# Patient Record
Sex: MALE | Race: WHITE | Employment: FULL TIME | URBAN - METROPOLITAN AREA
[De-identification: names, ages, dates, MRNs, and addresses within clinical notes are randomized per-mention and may not be internally consistent; named-entity substitution may affect disease eponyms.]

---

## 2021-09-21 RX ORDER — ALBUTEROL SULFATE 90 UG/1
2 AEROSOL, METERED RESPIRATORY (INHALATION) EVERY 6 HOURS PRN
COMMUNITY
Start: 2021-08-02

## 2021-09-21 RX ORDER — ZINC GLUCONATE 50 MG
50 TABLET ORAL DAILY
COMMUNITY

## 2021-09-21 RX ORDER — ROSUVASTATIN CALCIUM 40 MG/1
1 TABLET, COATED ORAL DAILY
COMMUNITY
Start: 2021-05-14

## 2021-09-21 RX ORDER — LOSARTAN POTASSIUM 50 MG/1
2 TABLET ORAL DAILY
COMMUNITY
Start: 2021-06-14

## 2021-09-21 RX ORDER — TADALAFIL 20 MG/1
20 TABLET ORAL DAILY PRN
COMMUNITY
Start: 2021-05-19

## 2021-09-21 RX ORDER — ASPIRIN 81 MG/1
81 TABLET ORAL DAILY
COMMUNITY

## 2021-09-21 RX ORDER — M-VIT,TX,IRON,MINS/CALC/FOLIC 27MG-0.4MG
1 TABLET ORAL DAILY
COMMUNITY

## 2021-09-21 NOTE — PROGRESS NOTES
[address] [facility] time____9/27/21 0900________        Surgery time_____1000_______    Take the following medications with a sip of water:    Do not eat or drink anything after 12:00 midnight prior to your surgery. This includes water chewing gum, mints and ice chips. You may brush your teeth and gargle the morning of your surgery, but do not swallow the water     Please see your family doctor/pediatrician for a history and physical and/or concerning medications. Bring any test results/reports from your physicians office. If you are under the care of a heart doctor or specialist doctor, please be aware that you may be asked to them for clearance    You may be asked to stop blood thinners such as Coumadin, Plavix, Fragmin, Lovenox, etc., or any anti-inflammatories such as:  Aspirin, Ibuprofen, Advil, Naproxen prior to your surgery. We also ask that you stop any OTC medications such as fish oil, vitamin E, glucosamine, garlic, Multivitamins, COQ 10, etc.    We ask that you do not smoke 24 hours prior to surgery  We ask that you do not  drink any alcoholic beverages 24 hours prior to surgery     You must make arrangements for a responsible adult to take you home after your surgery. For your safety you will not be allowed to leave alone or drive yourself home. Your surgery will be cancelled if you do not have a ride home. Also for your safety, it is strongly suggested that someone stay with you the first 24 hours after your surgery. A parent or legal guardian must accompany a child scheduled for surgery and plan to stay at the hospital until the child is discharged. Please do not bring other children with you. For your comfort, please wear simple loose fitting clothing to the hospital.  Please do not bring valuables.     Do not wear any make-up or nail polish on your fingers or toes      For your safety, please do not wear any jewelry or body piercing's on the day of surgery. All jewelry must be removed. If you have dentures, they will be removed before going to operating room. For your convenience, we will provide you with a container. If you wear contact lenses or glasses, they will be removed, please bring a case for them. If you have a living will and a durable power of  for healthcare, please bring in a copy. As part of our patient safety program to minimize surgical site infections, we ask you to do the following:    · Please notify your surgeon if you develop any illness between         now and the  day of your surgery. · This includes a cough, cold, fever, sore throat, nausea,         or vomiting, and diarrhea, etc.  ·  Please notify your surgeon if you experience dizziness, shortness         of breath or blurred vision between now and the time of your surgery. Do not shave your operative site 96 hours prior to surgery. For face and neck surgery, men may use an electric razor 48 hours   prior to surgery. You may shower the night before surgery or the morning of   your surgery with an antibacterial soap. You will need to bring a photo ID and insurance card    [facility] has an onsite pharmacy, would you like to utilize our pharmacy     If you will be staying overnight and use a C-pap machine, please bring   your C-pap to hospital     Our goal is to provide you with excellent care, therefore, visitors will be limited to two(2) in the room at a time so that we may focus on providing this care for you. Please contact pre-admission testing if you have any further questions. [facility] phone number:  [phone]  Please note these are generalized instructions for all surgical cases, you may be provided with more specific instructions according to your surgery.

## 2021-09-23 ENCOUNTER — ANESTHESIA EVENT (OUTPATIENT)
Dept: ENDOSCOPY | Age: 66
End: 2021-09-23
Payer: MEDICARE

## 2021-09-27 ENCOUNTER — ANESTHESIA (OUTPATIENT)
Dept: ENDOSCOPY | Age: 66
End: 2021-09-27
Payer: MEDICARE

## 2021-09-27 ENCOUNTER — HOSPITAL ENCOUNTER (OUTPATIENT)
Age: 66
Setting detail: OUTPATIENT SURGERY
Discharge: HOME | End: 2021-09-27
Attending: INTERNAL MEDICINE | Admitting: INTERNAL MEDICINE
Payer: MEDICARE

## 2021-09-27 VITALS
TEMPERATURE: 97.9 F | BODY MASS INDEX: 38.06 KG/M2 | HEIGHT: 72 IN | WEIGHT: 281 LBS | OXYGEN SATURATION: 95 % | HEART RATE: 66 BPM | DIASTOLIC BLOOD PRESSURE: 76 MMHG | SYSTOLIC BLOOD PRESSURE: 139 MMHG | RESPIRATION RATE: 18 BRPM

## 2021-09-27 VITALS — OXYGEN SATURATION: 93 % | SYSTOLIC BLOOD PRESSURE: 209 MMHG | DIASTOLIC BLOOD PRESSURE: 193 MMHG

## 2021-09-27 PROCEDURE — 3609027000 HC COLONOSCOPY: Performed by: INTERNAL MEDICINE

## 2021-09-27 PROCEDURE — 2580000003 HC RX 258: Performed by: STUDENT IN AN ORGANIZED HEALTH CARE EDUCATION/TRAINING PROGRAM

## 2021-09-27 PROCEDURE — 3700000000 HC ANESTHESIA ATTENDED CARE: Performed by: INTERNAL MEDICINE

## 2021-09-27 PROCEDURE — 2500000003 HC RX 250 WO HCPCS

## 2021-09-27 PROCEDURE — 6360000002 HC RX W HCPCS

## 2021-09-27 PROCEDURE — 3700000001 HC ADD 15 MINUTES (ANESTHESIA): Performed by: INTERNAL MEDICINE

## 2021-09-27 PROCEDURE — 7100000010 HC PHASE II RECOVERY - FIRST 15 MIN: Performed by: INTERNAL MEDICINE

## 2021-09-27 PROCEDURE — 7100000011 HC PHASE II RECOVERY - ADDTL 15 MIN: Performed by: INTERNAL MEDICINE

## 2021-09-27 RX ORDER — SODIUM CHLORIDE 0.9 % (FLUSH) 0.9 %
5-40 SYRINGE (ML) INJECTION EVERY 12 HOURS SCHEDULED
Status: DISCONTINUED | OUTPATIENT
Start: 2021-09-27 | End: 2021-09-27 | Stop reason: HOSPADM

## 2021-09-27 RX ORDER — LIDOCAINE HYDROCHLORIDE 20 MG/ML
INJECTION, SOLUTION EPIDURAL; INFILTRATION; INTRACAUDAL; PERINEURAL PRN
Status: DISCONTINUED | OUTPATIENT
Start: 2021-09-27 | End: 2021-09-27 | Stop reason: SDUPTHER

## 2021-09-27 RX ORDER — PROPOFOL 10 MG/ML
INJECTION, EMULSION INTRAVENOUS PRN
Status: DISCONTINUED | OUTPATIENT
Start: 2021-09-27 | End: 2021-09-27 | Stop reason: SDUPTHER

## 2021-09-27 RX ORDER — SODIUM CHLORIDE 9 MG/ML
25 INJECTION, SOLUTION INTRAVENOUS PRN
Status: DISCONTINUED | OUTPATIENT
Start: 2021-09-27 | End: 2021-09-27 | Stop reason: HOSPADM

## 2021-09-27 RX ORDER — SODIUM CHLORIDE 0.9 % (FLUSH) 0.9 %
5-40 SYRINGE (ML) INJECTION PRN
Status: DISCONTINUED | OUTPATIENT
Start: 2021-09-27 | End: 2021-09-27 | Stop reason: HOSPADM

## 2021-09-27 RX ORDER — SODIUM CHLORIDE 9 MG/ML
INJECTION, SOLUTION INTRAVENOUS CONTINUOUS
Status: DISCONTINUED | OUTPATIENT
Start: 2021-09-27 | End: 2021-09-27 | Stop reason: HOSPADM

## 2021-09-27 RX ADMIN — SODIUM CHLORIDE: 9 INJECTION, SOLUTION INTRAVENOUS at 09:45

## 2021-09-27 RX ADMIN — PROPOFOL 30 MG: 10 INJECTION, EMULSION INTRAVENOUS at 09:56

## 2021-09-27 RX ADMIN — PROPOFOL 20 MG: 10 INJECTION, EMULSION INTRAVENOUS at 10:02

## 2021-09-27 RX ADMIN — PROPOFOL 20 MG: 10 INJECTION, EMULSION INTRAVENOUS at 10:00

## 2021-09-27 RX ADMIN — PROPOFOL 100 MG: 10 INJECTION, EMULSION INTRAVENOUS at 09:53

## 2021-09-27 RX ADMIN — PROPOFOL 20 MG: 10 INJECTION, EMULSION INTRAVENOUS at 09:58

## 2021-09-27 RX ADMIN — LIDOCAINE HYDROCHLORIDE 60 MG: 20 INJECTION, SOLUTION EPIDURAL; INFILTRATION; INTRACAUDAL; PERINEURAL at 09:53

## 2021-09-27 ASSESSMENT — PAIN SCALES - GENERAL
PAINLEVEL_OUTOF10: 0
PAINLEVEL_OUTOF10: 0

## 2021-09-27 ASSESSMENT — PAIN - FUNCTIONAL ASSESSMENT: PAIN_FUNCTIONAL_ASSESSMENT: 0-10

## 2021-09-27 ASSESSMENT — LIFESTYLE VARIABLES: SMOKING_STATUS: 0

## 2021-09-27 NOTE — ANESTHESIA PRE PROCEDURE
[facility] Department of Anesthesiology  Pre-Anesthesia Evaluation/Consultation       Name:  Lindsey Nova  : 1955  Age:  77 y.o. MRN:  [id number]  Date: 2021           Surgeon: Surgeon(s):  Gloria Oscar MD    Procedure: Procedure(s):  COLORECTAL CANCER SCREENING, NOT HIGH RISK     No Known Allergies  There is no problem list on file for this patient. Past Medical History:   Diagnosis Date    External thrombosed hemorrhoids     Hypertension     Sleep apnea 2014    on c-pap     Past Surgical History:   Procedure Laterality Date    COLONOSCOPY      EYE SURGERY Bilateral 2018    JOINT REPLACEMENT       Social History     Tobacco Use    Smoking status: Never Smoker    Smokeless tobacco: Never Used   Vaping Use    Vaping Use: Never used   Substance Use Topics    Alcohol use: Yes    Drug use: Never     Medications  No current facility-administered medications on file prior to encounter.      Current Outpatient Medications on File Prior to Encounter   Medication Sig Dispense Refill    albuterol sulfate  (90 Base) MCG/ACT inhaler Inhale 2 puffs into the lungs every 6 hours as needed      aspirin 81 MG EC tablet Take 81 mg by mouth daily      losartan (COZAAR) 50 MG tablet 2 tablets daily      rosuvastatin (CRESTOR) 40 MG tablet 1 tablet daily      tadalafil (CIALIS) 20 MG tablet Take 20 mg by mouth daily as needed      Multiple Vitamins-Minerals (THERAPEUTIC MULTIVITAMIN-MINERALS) tablet Take 1 tablet by mouth daily      zinc gluconate 50 MG tablet Take 50 mg by mouth daily       Current Facility-Administered Medications   Medication Dose Route Frequency Provider Last Rate Last Admin    0.9 % sodium chloride infusion   IntraVENous Continuous Severo Laughter, MD        sodium chloride flush 0.9 % injection 5-40 mL  5-40 mL IntraVENous 2 times per day Severo Laughter, MD        sodium chloride flush 0.9 % injection 5-40 mL  5-40 mL IntraVENous PRN Severo Laughter, MD        0.9 % sodium chloride infusion  25 mL IntraVENous PRN Severo Laughter, MD         Vital Signs (Current)   Vitals:    09/21/21 0856   Weight: 284 lb (128.8 kg)   Height: 6' (1.829 m)                                            Vital Signs Statistics (for past 48 hrs)     No data recorded  BP Readings from Last 3 Encounters:   No data found for BP       BMI  Body mass index is 38.52 kg/m². Estimated body mass index is 38.52 kg/m² as calculated from the following:    Height as of this encounter: 6' (1.829 m). Weight as of this encounter: 284 lb (128.8 kg). CBC No results found for: WBC, RBC, HGB, HCT, MCV, RDW, PLT  CMP  No results found for: NA, K, CL, CO2, BUN, CREATININE, GFRAA, AGRATIO, LABGLOM, GLUCOSE, PROT, CALCIUM, BILITOT, ALKPHOS, AST, ALT  BMP  No results found for: NA, K, CL, CO2, BUN, CREATININE, CALCIUM, GFRAA, LABGLOM, GLUCOSE  POCGlucose  No results for input(s): GLUCOSE in the last 72 hours.    Coags  No results found for: PROTIME, INR, APTT  HCG (If Applicable) No results found for: PREGTESTUR, PREGSERUM, HCG, HCGQUANT   ABGs No results found for: PHART, PO2ART, WCU8UKR, BLI9VDZ, BEART, K6NQQPFI   Type & Screen (If Applicable)  No results found for: LABABO, LABRH                         BMI: Wt Readings from Last 3 Encounters:       NPO Status:  >8h                          Anesthesia Evaluation  Patient summary reviewed no history of anesthetic complications:   Airway: Mallampati: II  TM distance: >3 FB   Neck ROM: full  Mouth opening: > = 3 FB Dental: normal exam         Pulmonary: breath sounds clear to auscultation  (+) sleep apnea:      (-) COPD, asthma and not a current smoker                           Cardiovascular:    (+) hypertension:,     (-) past MI, CABG/stent,  angina and no hyperlipidemia        Rate: normal                    Neuro/Psych:      (-) seizures, TIA and CVA           GI/Hepatic/Renal:   (+) bowel prep,      (-) GERD Endo/Other:        (-) diabetes mellitus, hypothyroidism               Abdominal:             Vascular:     - DVT and PE. Other Findings:             Anesthesia Plan      MAC     ASA 2       Induction: intravenous. Anesthetic plan and risks discussed with patient. Plan discussed with CRNA. This pre-anesthesia assessment may be used as a history and physical.    DOS STAFF ADDENDUM:    Pt seen and examined, chart reviewed (including anesthesia, drug and allergy history). No interval changes to history and physical examination. Anesthetic plan, risks, benefits, alternatives, and personnel involved discussed with patient. Patient verbalized an understanding and agrees to proceed.       Cecilia Hartman MD  September 27, 2021  9:29 AM

## 2021-09-27 NOTE — H&P
[facility]   Pre-operative History and Physical    Patient: Landry Mehta  :   Acct#: [de-identified]    History Obtained From: electronic medical record    HISTORY OF PRESENT ILLNESS  Procedure:Colonoscopy  Indications:screening  Past Medical History:        Diagnosis Date    External thrombosed hemorrhoids 2009    Hypertension     Sleep apnea 2014    on c-pap     Past Surgical History:        Procedure Laterality Date    COLONOSCOPY      EYE SURGERY Bilateral 2018    JOINT REPLACEMENT       Medications prior to admission:   Prior to Admission medications    Medication Sig Start Date End Date Taking? Authorizing Provider   albuterol sulfate  (90 Base) MCG/ACT inhaler Inhale 2 puffs into the lungs every 6 hours as needed 21  Yes Historical Provider, MD   aspirin 81 MG EC tablet Take 81 mg by mouth daily   Yes Historical Provider, MD   losartan (COZAAR) 50 MG tablet 2 tablets daily 21  Yes Historical Provider, MD   rosuvastatin (CRESTOR) 40 MG tablet 1 tablet daily 21  Yes Historical Provider, MD   tadalafil (CIALIS) 20 MG tablet Take 20 mg by mouth daily as needed 21  Yes Historical Provider, MD   Multiple Vitamins-Minerals (THERAPEUTIC MULTIVITAMIN-MINERALS) tablet Take 1 tablet by mouth daily   Yes Historical Provider, MD   zinc gluconate 50 MG tablet Take 50 mg by mouth daily   Yes Historical Provider, MD     Allergies:   Patient has no known allergies. Social History     Socioeconomic History    Marital status:      Spouse name: Not on file    Number of children: Not on file    Years of education: Not on file    Highest education level: Not on file   Occupational History    Not on file   Tobacco Use    Smoking status: Never Smoker    Smokeless tobacco: Never Used   Vaping Use    Vaping Use: Never used   Substance and Sexual Activity    Alcohol use:  Yes    Drug use: Never    Sexual activity: Yes     Partners: Female   Other Topics Concern    Not on file   Social History Narrative    Not on file     Social Determinants of Health     Financial Resource Strain:     Difficulty of Paying Living Expenses:    Food Insecurity:     Worried About Running Out of Food in the Last Year:     [address] in the Last Year:    Transportation Needs:     Lack of Transportation (Medical):  Lack of Transportation (Non-Medical):    Physical Activity:     Days of Exercise per Week:     Minutes of Exercise per Session:    Stress:     Feeling of Stress :    Social Connections:     Frequency of Communication with Friends and Family:     Frequency of Social Gatherings with Friends and Family:     Attends Orthodoxy Services:     Active Member of Clubs or Organizations:     Attends Club or Organization Meetings:     Marital Status:    Intimate Partner Violence:     Fear of Current or Ex-Partner:     Emotionally Abused:     Physically Abused:     Sexually Abused:      Family History   Problem Relation Age of Onset    Other Mother         liver failure    Heart Disease Father     High Blood Pressure Father     Cancer Father         leukemia         PHYSICAL EXAM:      BP (!) 178/83   Pulse 70   Temp 98.2 °F (36.8 °C) (Temporal)   Resp 18   Ht 6' (1.829 m)   Wt 281 lb (127.5 kg)   SpO2 98%   BMI 38.11 kg/m²  I        Heart:normal    Lungs: normal    Abdomen: normal      ASA Grade:  See anesthesia note      ASSESSMENT AND PLAN:    1. Procedure options, risks and benefits reviewed with patient and expresses understanding.

## 2021-09-27 NOTE — PROCEDURES
[facility]  Endoscopy Note    Patient: Brian Black  :   Acct#: [de-identified]    Procedure: Colonoscopy     Date:  2021    Surgeon:  Bobbi Weaver MD, MD    Referring Physician:  Raj Reza    Previous Colonoscopy: Yes  Date: unknown  Greater than 3 years? Yes    Preoperative Diagnosis:  screening    Postoperative Diagnosis:  Normal colon but poor prep    Anesthesia:  See anesthesia note    Indications: This is a 77y.o. year old male who presents today with screening for colon cancer. Procedure: An informed consent was obtained from the patient after explanation of indications, benefits, possible risks and complications of the procedure. The patient was then taken to the endoscopy suite, placed in the left lateral decubitus position, and the above IV anesthesia was administered. A digital rectal examination was performed and revealed negative without mass, lesions or tenderness. The Olympus CFQ-180-AL video colonoscope was placed in the patient's rectum under digital direction and advanced to the cecum. The cecum was identified by characteristic anatomy and ballottment. The ileocecal valve was identified. The preparation was poor. The scope was then withdrawn back through the cecum, ascending, transverse, descending and sigmoid colons. Carefull circumferential examination of the mucosa in these areas demonstrated normal colonic mucosa throughout. The scope was then withdrawn into the rectum and retroflexed. The retroflexed view of the anal verge and rectum demonstrates no abnormalities. The scope was straightened, the colon was decompressed and the scope was withdrawn from the patient. The patient tolerated the procedure well and was taken to the PACU in good condition. Estimated Blood Loss:  none    Impression:  Normal colon    Recommendations:  Repeat colonoscopy in 5 years.     Bobbi Weaver MD, MD   [facility]  2021

## 2025-01-11 ENCOUNTER — HOSPITAL ENCOUNTER (INPATIENT)
Age: 70
LOS: 1 days | Discharge: HOME | DRG: 312 | End: 2025-01-13
Attending: STUDENT IN AN ORGANIZED HEALTH CARE EDUCATION/TRAINING PROGRAM
Payer: MEDICARE

## 2025-01-11 ENCOUNTER — APPOINTMENT (OUTPATIENT)
Dept: CT IMAGING | Age: 70
DRG: 312 | End: 2025-01-11
Payer: MEDICARE

## 2025-01-11 ENCOUNTER — APPOINTMENT (OUTPATIENT)
Dept: GENERAL RADIOLOGY | Age: 70
DRG: 312 | End: 2025-01-11
Payer: MEDICARE

## 2025-01-11 DIAGNOSIS — R55 SYNCOPE AND COLLAPSE: Primary | ICD-10-CM

## 2025-01-11 DIAGNOSIS — R40.4 TRANSIENT ALTERATION OF AWARENESS: ICD-10-CM

## 2025-01-11 DIAGNOSIS — R11.2 NAUSEA AND VOMITING, UNSPECIFIED VOMITING TYPE: ICD-10-CM

## 2025-01-11 DIAGNOSIS — R55 SYNCOPE, UNSPECIFIED SYNCOPE TYPE: ICD-10-CM

## 2025-01-11 LAB
ALBUMIN SERPL-MCNC: 4.4 G/DL (ref 3.4–5)
ALBUMIN/GLOB SERPL: 2.1 {RATIO} (ref 1.1–2.2)
ALP SERPL-CCNC: 42 U/L (ref 40–129)
ALT SERPL-CCNC: 26 U/L (ref 10–40)
ANION GAP SERPL CALCULATED.3IONS-SCNC: 12 MMOL/L (ref 3–16)
AST SERPL-CCNC: 27 U/L (ref 15–37)
BASOPHILS # BLD: 0 K/UL (ref 0–0.2)
BASOPHILS NFR BLD: 0.8 %
BILIRUB SERPL-MCNC: <0.2 MG/DL (ref 0–1)
BUN SERPL-MCNC: 22 MG/DL (ref 7–20)
CALCIUM SERPL-MCNC: 8.7 MG/DL (ref 8.3–10.6)
CHLORIDE SERPL-SCNC: 102 MMOL/L (ref 99–110)
CO2 SERPL-SCNC: 26 MMOL/L (ref 21–32)
CREAT SERPL-MCNC: 1 MG/DL (ref 0.8–1.3)
DEPRECATED RDW RBC AUTO: 14.2 % (ref 12.4–15.4)
EOSINOPHIL # BLD: 0.1 K/UL (ref 0–0.6)
EOSINOPHIL NFR BLD: 1.1 %
GFR SERPLBLD CREATININE-BSD FMLA CKD-EPI: 81 ML/MIN/{1.73_M2}
GLUCOSE BLD-MCNC: 170 MG/DL (ref 70–99)
GLUCOSE SERPL-MCNC: 149 MG/DL (ref 70–99)
HCT VFR BLD AUTO: 42 % (ref 40.5–52.5)
HGB BLD-MCNC: 14.2 G/DL (ref 13.5–17.5)
LYMPHOCYTES # BLD: 1.3 K/UL (ref 1–5.1)
LYMPHOCYTES NFR BLD: 25.5 %
MCH RBC QN AUTO: 32.1 PG (ref 26–34)
MCHC RBC AUTO-ENTMCNC: 33.8 G/DL (ref 31–36)
MCV RBC AUTO: 95.1 FL (ref 80–100)
MONOCYTES # BLD: 0.4 K/UL (ref 0–1.3)
MONOCYTES NFR BLD: 7.4 %
NEUTROPHILS # BLD: 3.4 K/UL (ref 1.7–7.7)
NEUTROPHILS NFR BLD: 65.2 %
PERFORMED ON: ABNORMAL
PLATELET # BLD AUTO: 212 K/UL (ref 135–450)
PMV BLD AUTO: 7.6 FL (ref 5–10.5)
POTASSIUM SERPL-SCNC: 3.8 MMOL/L (ref 3.5–5.1)
PROT SERPL-MCNC: 6.5 G/DL (ref 6.4–8.2)
RBC # BLD AUTO: 4.42 M/UL (ref 4.2–5.9)
SODIUM SERPL-SCNC: 140 MMOL/L (ref 136–145)
TROPONIN, HIGH SENSITIVITY: 11 NG/L (ref 0–22)
WBC # BLD AUTO: 5.2 K/UL (ref 4–11)

## 2025-01-11 PROCEDURE — 99285 EMERGENCY DEPT VISIT HI MDM: CPT

## 2025-01-11 PROCEDURE — 80053 COMPREHEN METABOLIC PANEL: CPT

## 2025-01-11 PROCEDURE — 70450 CT HEAD/BRAIN W/O DYE: CPT

## 2025-01-11 PROCEDURE — 85025 COMPLETE CBC W/AUTO DIFF WBC: CPT

## 2025-01-11 PROCEDURE — 93005 ELECTROCARDIOGRAM TRACING: CPT | Performed by: STUDENT IN AN ORGANIZED HEALTH CARE EDUCATION/TRAINING PROGRAM

## 2025-01-11 PROCEDURE — 84484 ASSAY OF TROPONIN QUANT: CPT

## 2025-01-11 PROCEDURE — 71045 X-RAY EXAM CHEST 1 VIEW: CPT

## 2025-01-11 ASSESSMENT — PAIN - FUNCTIONAL ASSESSMENT: PAIN_FUNCTIONAL_ASSESSMENT: 0-10

## 2025-01-11 ASSESSMENT — PAIN SCALES - GENERAL: PAINLEVEL_OUTOF10: 0

## 2025-01-12 ENCOUNTER — APPOINTMENT (OUTPATIENT)
Dept: CT IMAGING | Age: 70
DRG: 312 | End: 2025-01-12
Payer: MEDICARE

## 2025-01-12 PROBLEM — R55 PRE-SYNCOPE: Status: ACTIVE | Noted: 2025-01-12

## 2025-01-12 LAB
TROPONIN, HIGH SENSITIVITY: 12 NG/L (ref 0–22)
TSH SERPL DL<=0.005 MIU/L-ACNC: 1.37 UIU/ML (ref 0.27–4.2)

## 2025-01-12 PROCEDURE — 6360000004 HC RX CONTRAST MEDICATION: Performed by: STUDENT IN AN ORGANIZED HEALTH CARE EDUCATION/TRAINING PROGRAM

## 2025-01-12 PROCEDURE — 70498 CT ANGIOGRAPHY NECK: CPT

## 2025-01-12 PROCEDURE — 6370000000 HC RX 637 (ALT 250 FOR IP)

## 2025-01-12 PROCEDURE — 84443 ASSAY THYROID STIM HORMONE: CPT

## 2025-01-12 PROCEDURE — 1200000000 HC SEMI PRIVATE

## 2025-01-12 PROCEDURE — 84484 ASSAY OF TROPONIN QUANT: CPT

## 2025-01-12 PROCEDURE — 94760 N-INVAS EAR/PLS OXIMETRY 1: CPT

## 2025-01-12 PROCEDURE — 2500000003 HC RX 250 WO HCPCS

## 2025-01-12 PROCEDURE — 6360000002 HC RX W HCPCS

## 2025-01-12 RX ORDER — ENOXAPARIN SODIUM 100 MG/ML
30 INJECTION SUBCUTANEOUS 2 TIMES DAILY
Status: DISCONTINUED | OUTPATIENT
Start: 2025-01-12 | End: 2025-01-13 | Stop reason: HOSPADM

## 2025-01-12 RX ORDER — ASPIRIN 81 MG/1
81 TABLET ORAL DAILY
Status: DISCONTINUED | OUTPATIENT
Start: 2025-01-12 | End: 2025-01-13 | Stop reason: HOSPADM

## 2025-01-12 RX ORDER — MAGNESIUM SULFATE IN WATER 40 MG/ML
2000 INJECTION, SOLUTION INTRAVENOUS PRN
Status: DISCONTINUED | OUTPATIENT
Start: 2025-01-12 | End: 2025-01-13 | Stop reason: HOSPADM

## 2025-01-12 RX ORDER — ALBUTEROL SULFATE 90 UG/1
2 INHALANT RESPIRATORY (INHALATION) EVERY 6 HOURS PRN
Status: DISCONTINUED | OUTPATIENT
Start: 2025-01-12 | End: 2025-01-13 | Stop reason: HOSPADM

## 2025-01-12 RX ORDER — DULAGLUTIDE 4.5 MG/.5ML
4.5 INJECTION, SOLUTION SUBCUTANEOUS WEEKLY
COMMUNITY
Start: 2025-01-13

## 2025-01-12 RX ORDER — POTASSIUM CHLORIDE 1500 MG/1
40 TABLET, EXTENDED RELEASE ORAL PRN
Status: DISCONTINUED | OUTPATIENT
Start: 2025-01-12 | End: 2025-01-13 | Stop reason: HOSPADM

## 2025-01-12 RX ORDER — OLMESARTAN MEDOXOMIL 20 MG/1
20 TABLET ORAL DAILY
COMMUNITY

## 2025-01-12 RX ORDER — SODIUM CHLORIDE 0.9 % (FLUSH) 0.9 %
5-40 SYRINGE (ML) INJECTION PRN
Status: DISCONTINUED | OUTPATIENT
Start: 2025-01-12 | End: 2025-01-13 | Stop reason: HOSPADM

## 2025-01-12 RX ORDER — SODIUM CHLORIDE 0.9 % (FLUSH) 0.9 %
5-40 SYRINGE (ML) INJECTION EVERY 12 HOURS SCHEDULED
Status: DISCONTINUED | OUTPATIENT
Start: 2025-01-12 | End: 2025-01-13 | Stop reason: HOSPADM

## 2025-01-12 RX ORDER — ONDANSETRON 4 MG/1
4 TABLET, ORALLY DISINTEGRATING ORAL EVERY 8 HOURS PRN
Status: DISCONTINUED | OUTPATIENT
Start: 2025-01-12 | End: 2025-01-13 | Stop reason: HOSPADM

## 2025-01-12 RX ORDER — ONDANSETRON 2 MG/ML
4 INJECTION INTRAMUSCULAR; INTRAVENOUS EVERY 6 HOURS PRN
Status: DISCONTINUED | OUTPATIENT
Start: 2025-01-12 | End: 2025-01-13 | Stop reason: HOSPADM

## 2025-01-12 RX ORDER — POTASSIUM CHLORIDE 7.45 MG/ML
10 INJECTION INTRAVENOUS PRN
Status: DISCONTINUED | OUTPATIENT
Start: 2025-01-12 | End: 2025-01-13 | Stop reason: HOSPADM

## 2025-01-12 RX ORDER — LOSARTAN POTASSIUM 25 MG/1
25 TABLET ORAL DAILY
Status: DISCONTINUED | OUTPATIENT
Start: 2025-01-12 | End: 2025-01-13 | Stop reason: HOSPADM

## 2025-01-12 RX ORDER — ACETAMINOPHEN 325 MG/1
650 TABLET ORAL EVERY 6 HOURS PRN
Status: DISCONTINUED | OUTPATIENT
Start: 2025-01-12 | End: 2025-01-13 | Stop reason: HOSPADM

## 2025-01-12 RX ORDER — METFORMIN HYDROCHLORIDE 500 MG/1
1000 TABLET, EXTENDED RELEASE ORAL
COMMUNITY

## 2025-01-12 RX ORDER — SODIUM CHLORIDE 9 MG/ML
INJECTION, SOLUTION INTRAVENOUS PRN
Status: DISCONTINUED | OUTPATIENT
Start: 2025-01-12 | End: 2025-01-13 | Stop reason: HOSPADM

## 2025-01-12 RX ORDER — POLYETHYLENE GLYCOL 3350 17 G/17G
17 POWDER, FOR SOLUTION ORAL DAILY PRN
Status: DISCONTINUED | OUTPATIENT
Start: 2025-01-12 | End: 2025-01-13 | Stop reason: HOSPADM

## 2025-01-12 RX ORDER — IOPAMIDOL 755 MG/ML
75 INJECTION, SOLUTION INTRAVASCULAR
Status: COMPLETED | OUTPATIENT
Start: 2025-01-12 | End: 2025-01-12

## 2025-01-12 RX ORDER — ACETAMINOPHEN 650 MG/1
650 SUPPOSITORY RECTAL EVERY 6 HOURS PRN
Status: DISCONTINUED | OUTPATIENT
Start: 2025-01-12 | End: 2025-01-13 | Stop reason: HOSPADM

## 2025-01-12 RX ORDER — ROSUVASTATIN CALCIUM 40 MG/1
40 TABLET, COATED ORAL DAILY
Status: DISCONTINUED | OUTPATIENT
Start: 2025-01-12 | End: 2025-01-13 | Stop reason: HOSPADM

## 2025-01-12 RX ADMIN — IOPAMIDOL 75 ML: 755 INJECTION, SOLUTION INTRAVENOUS at 01:51

## 2025-01-12 RX ADMIN — ENOXAPARIN SODIUM 30 MG: 100 INJECTION SUBCUTANEOUS at 20:47

## 2025-01-12 RX ADMIN — SODIUM CHLORIDE, PRESERVATIVE FREE 10 ML: 5 INJECTION INTRAVENOUS at 20:47

## 2025-01-12 RX ADMIN — LOSARTAN POTASSIUM 25 MG: 25 TABLET, FILM COATED ORAL at 09:08

## 2025-01-12 RX ADMIN — SODIUM CHLORIDE, PRESERVATIVE FREE 10 ML: 5 INJECTION INTRAVENOUS at 09:08

## 2025-01-12 RX ADMIN — ASPIRIN 81 MG: 81 TABLET, COATED ORAL at 09:08

## 2025-01-12 RX ADMIN — ROSUVASTATIN CALCIUM 40 MG: 40 TABLET, FILM COATED ORAL at 09:07

## 2025-01-12 RX ADMIN — ENOXAPARIN SODIUM 30 MG: 100 INJECTION SUBCUTANEOUS at 09:08

## 2025-01-12 ASSESSMENT — PAIN SCALES - GENERAL
PAINLEVEL_OUTOF10: 0
PAINLEVEL_OUTOF10: 0

## 2025-01-12 NOTE — ED TRIAGE NOTES
Patient presents after syncopal episode approximately 1 hour ago where his wife states that he was unconscious at the table at a restaurant and EMS was called. Patient wife states EMS did not bring him, but she did via private vehicle.

## 2025-01-12 NOTE — DISCHARGE INSTR - COC
Continuity of Care Form    Patient Name: Andrews Sanchez   :  1955  MRN:  [id number]    Admit date:  2025  Discharge date:  ***    Code Status Order: Full Code   Advance Directives:   Advance Care Flowsheet Documentation             Admitting Physician:  Shahla Bai MD  PCP: Jeff Oneil MD    Discharging Nurse: ***  Discharging Hospital Unit/Room#: P8G-5393/3112-01  Discharging Unit Phone Number: ***    Emergency Contact:   Extended Emergency Contact Information  Primary Emergency Contact: Andreia Sanchez  Home Phone: [phone]  Mobile Phone: [phone]  Relation: Spouse    Past Surgical History:  Past Surgical History:   Procedure Laterality Date    COLONOSCOPY      COLONOSCOPY N/A 2021    COLORECTAL CANCER SCREENING, NOT HIGH RISK performed by Don Garcia MD at [facility] ENDOSCOPY    EYE SURGERY Bilateral 2018    JOINT REPLACEMENT         Immunization History:   Immunization History   Administered Date(s) Administered    COVID-19, PFIZER PURPLE top, DILUTE for use, (age 12 y+), 30mcg/0.3mL 2021, 2021       Active Problems:  Patient Active Problem List   Diagnosis Code    Pre-syncope R55       Isolation/Infection:   Isolation            No Isolation          Patient Infection Status       None to display            Nurse Assessment:  Last Vital Signs: /67   Pulse 65   Temp 98 °F (36.7 °C) (Oral)   Resp 16   Ht 1.829 m (6')   Wt 121.4 kg (267 lb 10.2 oz)   SpO2 94%   BMI 36.30 kg/m²     Last documented pain score (0-10 scale): Pain Level: 0  Last Weight:   Wt Readings from Last 1 Encounters:   25 121.4 kg (267 lb 10.2 oz)     Mental Status:  {IP PT MENTAL STATUS:88468}    IV Access:  { BRENT IV ACCESS:609106242}    Nursing Mobility/ADLs:  Walking   {CHP DME ADLs:348117993}  Transfer  {CHP DME ADLs:624961409}  Bathing  {CHP DME ADLs:044168457}  Dressing  {CHP DME ADLs:428327438}  Toileting  {CHP DME ADLs:848810174}  Feeding  {CHP DME

## 2025-01-12 NOTE — PLAN OF CARE
Problem: Discharge Planning  Goal: Discharge to home or other facility with appropriate resources  Outcome: Progressing  Flowsheets  Taken 1/12/2025 1050  Discharge to home or other facility with appropriate resources: Identify barriers to discharge with patient and caregiver  Taken 1/12/2025 0915  Discharge to home or other facility with appropriate resources: Identify barriers to discharge with patient and caregiver     Problem: Pain  Goal: Verbalizes/displays adequate comfort level or baseline comfort level  Outcome: Progressing

## 2025-01-12 NOTE — PROGRESS NOTES
Patient in bed, A&O X4. VSS. PIV to L AC flushed, dressing CDI at this time. Patient denies pain. All AM medications taken whole without complaint (see eMAR).  Patient tolerating PO intake and appetite adequate. No other needs verbalized at this time. Standard safety precautions in place and call light within reach.

## 2025-01-12 NOTE — ED PROVIDER NOTES
FOLLOW UP THE PATIENT:  No follow-up provider specified.      Electronically Signed: Amador Mccauley DO, 01/12/25, 4:57 AM    This report has been produced using speech recognition software and may contain errors related to that system including errors in grammar, punctuation, and spelling, as well as words and phrases that may be inappropriate. If there are any questions or concerns please feel free to contact the dictating provider for clarification.       Amador Mccauley DO  01/12/25 2525

## 2025-01-12 NOTE — PROGRESS NOTES
Patient admitted to room 3112 via stretcher from ER with wife at bedside. Tele monitor placed on patient. Pt denied dizziness after getting off stretcher, denied nausea. Will continue to monitor.

## 2025-01-12 NOTE — ED NOTES
ED TO INPATIENT SBAR HANDOFF    Patient Name: Jeanette Sanchez   Preferred Name: JEANETTE  : 1955  69 y.o.   Family/Caregiver Present: no   Code Status Order: No Order  PO Status: NPO:No  Telemetry Order:   C-SSRS: Risk of Suicide: No Risk  Sitter no   Restraints:     Sepsis Risk Score      Situation  Chief Complaint   Patient presents with    Loss of Consciousness     Patient presents with episode of syncope while at a restaurant this evening. EMS arrived, and he was driven by his wife here. Wife reports episode of 2 minutes where patient was non-responsive. Patient had vomiting as well.     Brief Description of Patient's Condition:   Mental Status: oriented, alert, coherent, logical, thought processes intact, and able to concentrate and follow conversation  Arrived from:Home  Imaging:   CTA HEAD NECK W CONTRAST   Final Result   No large vessel occlusion in the head or neck.         XR CHEST 1 VIEW   Final Result   Borderline cardiomegaly. No acute cardiopulmonary process.         CT HEAD WO CONTRAST   Final Result   No acute intracranial abnormality.           Abnormal labs:   Abnormal Labs Reviewed   COMPREHENSIVE METABOLIC PANEL W/ REFLEX TO MG FOR LOW K - Abnormal; Notable for the following components:       Result Value    Glucose 149 (*)     BUN 22 (*)     All other components within normal limits   POCT GLUCOSE - Abnormal; Notable for the following components:    POC Glucose 170 (*)     All other components within normal limits       Background  Allergies: No Known Allergies  History:   Past Medical History:   Diagnosis Date    External thrombosed hemorrhoids 2009    Hypertension     Sleep apnea 2014    on c-pap       Assessment  Vitals:    Level of Consciousness: Alert (0)   Vitals:    25 0400 25 0415 25 0430 25 0445   BP: 125/78 124/74     Pulse: 63 62 64 62   Resp: 18 18 16 18   Temp:       TempSrc:       SpO2: 95% 94% 95% 96%   Weight:       Height:         Deterioration Index

## 2025-01-12 NOTE — PROGRESS NOTES
Pharmacy Medication Reconciliation Note     List of medications patient is currently taking is complete.    Source of information:   1. Conversation with Trent at bedside.      Notes regarding home medications:   1. Added Trulicity( Monday injection) and Metformin ER to list  2. He confirms taking all meds a prescribed     Candi Mejía RPH   1/12/2025  10:49 AM

## 2025-01-12 NOTE — H&P
V2.0  History and Physical      Name:  Andrews Sanchez /Age/Sex: 1955  (69 y.o. male)   MRN & CSN:  [id number] & [id number] Encounter Date/Time: 2025 11:09 AM EST   Location:  N1C-7916/3112-01 PCP: Jeff Oneil MD       Hospital Day: 2    Assessment and Plan:   Andrews Sanchez is a 69 y.o. male with a pmh of sleep apnea, on CPAP, hypertension, hyperlipidemia, post COVID myocarditis, coronary artery disease detected by CT in , on risk factor modification, obesity who presents with Pre-syncope.  Patient was with his wife elevated dinner in a restaurant.  Suddenly became unresponsive when his eyes were open.  This episode lasted around 2 minutes.  Patient placed on to the ground by the people around.  And then he developed episodes of vomiting.  Patient does not report feeling sick having any chest pain or lightheadedness or palpitation before the event.  And he become back to his normal state shortly after.  Vital stable in ER, saturation normal on room air.  Troponin negative.  EKG sinus rhythm, no acute ischemic changes.  CT head and CTA head and neck no acute pathology.  Patient admitted to hospital for further workup for presyncope.  Hospital Problems             Last Modified POA    * (Principal) Pre-syncope 2025 Yes       Plan:  Presyncope, no clear etiology.  EKG, troponin, CT head, CTA head and neck unremarkable.  Patient known history of CAD per CT in  but no intervention.  No recent echocardiogram found in the system so I will order. Patient sees Dr. Payton from cardiology at [facility].  Less likely a cardiac etiology at this point.  Consulted neurology for further input.  Plan for MRI.  Ordered.  History of hypertension, hyperlipidemia, resume home medications  History of sleep apnea    Disposition:   Current Living situation: From home  Expected Disposition: To home  Estimated D/C: Likely next 48 hours    Diet ADULT DIET; Regular   DVT Prophylaxis [x] Lovenox, []

## 2025-01-13 ENCOUNTER — APPOINTMENT (OUTPATIENT)
Age: 70
DRG: 312 | End: 2025-01-13
Attending: INTERNAL MEDICINE
Payer: MEDICARE

## 2025-01-13 ENCOUNTER — APPOINTMENT (OUTPATIENT)
Dept: MRI IMAGING | Age: 70
DRG: 312 | End: 2025-01-13
Payer: MEDICARE

## 2025-01-13 VITALS
OXYGEN SATURATION: 96 % | SYSTOLIC BLOOD PRESSURE: 143 MMHG | DIASTOLIC BLOOD PRESSURE: 88 MMHG | HEIGHT: 72 IN | RESPIRATION RATE: 16 BRPM | WEIGHT: 267 LBS | HEART RATE: 61 BPM | TEMPERATURE: 98.3 F | BODY MASS INDEX: 36.16 KG/M2

## 2025-01-13 PROBLEM — I25.10 CORONARY ARTERY CALCIFICATION: Status: ACTIVE | Noted: 2025-01-13

## 2025-01-13 PROBLEM — I77.810 ASCENDING AORTA DILATION (HCC): Status: ACTIVE | Noted: 2025-01-13

## 2025-01-13 LAB
ECHO AO ASC DIAM: 4.3 CM
ECHO AO ASCENDING AORTA INDEX: 1.78 CM/M2
ECHO AO ROOT DIAM: 4.7 CM
ECHO AO ROOT INDEX: 1.95 CM/M2
ECHO AV AREA PEAK VELOCITY: 3.1 CM2
ECHO AV AREA VTI: 3 CM2
ECHO AV AREA/BSA PEAK VELOCITY: 1.3 CM2/M2
ECHO AV AREA/BSA VTI: 1.2 CM2/M2
ECHO AV MEAN GRADIENT: 3 MMHG
ECHO AV MEAN VELOCITY: 0.8 M/S
ECHO AV PEAK GRADIENT: 6 MMHG
ECHO AV PEAK VELOCITY: 1.3 M/S
ECHO AV VELOCITY RATIO: 0.92
ECHO AV VTI: 26.7 CM
ECHO BSA: 2.48 M2
ECHO EST RA PRESSURE: 3 MMHG
ECHO IVC PROX: 1.8 CM
ECHO LA AREA 2C: 32.5 CM2
ECHO LA AREA 4C: 14.6 CM2
ECHO LA MAJOR AXIS: 6.5 CM
ECHO LA MINOR AXIS: 6.7 CM
ECHO LA VOL BP: 56 ML (ref 18–58)
ECHO LA VOL MOD A2C: 125 ML (ref 18–58)
ECHO LA VOL MOD A4C: 25 ML (ref 18–58)
ECHO LA VOL/BSA BIPLANE: 23 ML/M2 (ref 16–34)
ECHO LA VOLUME INDEX MOD A2C: 52 ML/M2 (ref 16–34)
ECHO LA VOLUME INDEX MOD A4C: 10 ML/M2 (ref 16–34)
ECHO LV E' LATERAL VELOCITY: 14.6 CM/S
ECHO LV E' SEPTAL VELOCITY: 11.1 CM/S
ECHO LV EDV A2C: 116 ML
ECHO LV EDV A4C: 116 ML
ECHO LV EDV INDEX A4C: 48 ML/M2
ECHO LV EDV NDEX A2C: 48 ML/M2
ECHO LV EF PHYSICIAN: 65 %
ECHO LV EJECTION FRACTION A2C: 68 %
ECHO LV EJECTION FRACTION A4C: 66 %
ECHO LV EJECTION FRACTION BIPLANE: 68 % (ref 55–100)
ECHO LV ESV A2C: 37 ML
ECHO LV ESV A4C: 39 ML
ECHO LV ESV INDEX A2C: 15 ML/M2
ECHO LV ESV INDEX A4C: 16 ML/M2
ECHO LV FRACTIONAL SHORTENING: 33 % (ref 28–44)
ECHO LV INTERNAL DIMENSION DIASTOLE INDEX: 2.12 CM/M2
ECHO LV INTERNAL DIMENSION DIASTOLIC: 5.1 CM (ref 4.2–5.9)
ECHO LV INTERNAL DIMENSION SYSTOLIC INDEX: 1.41 CM/M2
ECHO LV INTERNAL DIMENSION SYSTOLIC: 3.4 CM
ECHO LV IVSD: 0.9 CM (ref 0.6–1)
ECHO LV MASS 2D: 175.6 G (ref 88–224)
ECHO LV MASS INDEX 2D: 72.9 G/M2 (ref 49–115)
ECHO LV POSTERIOR WALL DIASTOLIC: 1 CM (ref 0.6–1)
ECHO LV RELATIVE WALL THICKNESS RATIO: 0.39
ECHO LVOT AREA: 3.1 CM2
ECHO LVOT AV VTI INDEX: 0.91
ECHO LVOT DIAM: 2 CM
ECHO LVOT MEAN GRADIENT: 3 MMHG
ECHO LVOT PEAK GRADIENT: 6 MMHG
ECHO LVOT PEAK VELOCITY: 1.2 M/S
ECHO LVOT STROKE VOLUME INDEX: 31.7 ML/M2
ECHO LVOT SV: 76.3 ML
ECHO LVOT VTI: 24.3 CM
ECHO MV A VELOCITY: 1.16 M/S
ECHO MV AREA VTI: 2.2 CM2
ECHO MV E DECELERATION TIME (DT): 271 MS
ECHO MV E VELOCITY: 0.88 M/S
ECHO MV E/A RATIO: 0.76
ECHO MV E/E' LATERAL: 6.03
ECHO MV E/E' RATIO (AVERAGED): 6.98
ECHO MV E/E' SEPTAL: 7.93
ECHO MV LVOT VTI INDEX: 1.42
ECHO MV MAX VELOCITY: 1.1 M/S
ECHO MV MEAN GRADIENT: 2 MMHG
ECHO MV MEAN VELOCITY: 0.6 M/S
ECHO MV PEAK GRADIENT: 5 MMHG
ECHO MV VTI: 34.5 CM
ECHO PV MAX VELOCITY: 1.1 M/S
ECHO PV PEAK GRADIENT: 4 MMHG
ECHO RA AREA 4C: 18.4 CM2
ECHO RA END SYSTOLIC VOLUME APICAL 4 CHAMBER INDEX BSA: 21 ML/M2
ECHO RA VOLUME: 50 ML
ECHO RIGHT VENTRICULAR SYSTOLIC PRESSURE (RVSP): 6 MMHG
ECHO RV BASAL DIMENSION: 3.7 CM
ECHO RV FREE WALL PEAK S': 13.8 CM/S
ECHO RV MID DIMENSION: 2.8 CM
ECHO RV TAPSE: 2.4 CM (ref 1.7–?)
ECHO TV REGURGITANT MAX VELOCITY: 0.85 M/S
ECHO TV REGURGITANT PEAK GRADIENT: 3 MMHG
EKG ATRIAL RATE: 88 BPM
EKG DIAGNOSIS: NORMAL
EKG P AXIS: 62 DEGREES
EKG P-R INTERVAL: 222 MS
EKG Q-T INTERVAL: 354 MS
EKG QRS DURATION: 88 MS
EKG QTC CALCULATION (BAZETT): 428 MS
EKG R AXIS: 71 DEGREES
EKG T AXIS: 63 DEGREES
EKG VENTRICULAR RATE: 88 BPM
GLUCOSE BLD-MCNC: 113 MG/DL (ref 70–99)
PERFORMED ON: ABNORMAL

## 2025-01-13 PROCEDURE — 6370000000 HC RX 637 (ALT 250 FOR IP): Performed by: STUDENT IN AN ORGANIZED HEALTH CARE EDUCATION/TRAINING PROGRAM

## 2025-01-13 PROCEDURE — 6370000000 HC RX 637 (ALT 250 FOR IP)

## 2025-01-13 PROCEDURE — 94760 N-INVAS EAR/PLS OXIMETRY 1: CPT

## 2025-01-13 PROCEDURE — 93306 TTE W/DOPPLER COMPLETE: CPT

## 2025-01-13 PROCEDURE — 6360000002 HC RX W HCPCS

## 2025-01-13 PROCEDURE — 9990000010 HC NO CHARGE VISIT

## 2025-01-13 PROCEDURE — 76376 3D RENDER W/INTRP POSTPROCES: CPT | Performed by: INTERNAL MEDICINE

## 2025-01-13 PROCEDURE — 2500000003 HC RX 250 WO HCPCS

## 2025-01-13 PROCEDURE — 93010 ELECTROCARDIOGRAM REPORT: CPT | Performed by: INTERNAL MEDICINE

## 2025-01-13 PROCEDURE — 70551 MRI BRAIN STEM W/O DYE: CPT

## 2025-01-13 PROCEDURE — 99223 1ST HOSP IP/OBS HIGH 75: CPT | Performed by: INTERNAL MEDICINE

## 2025-01-13 PROCEDURE — 93306 TTE W/DOPPLER COMPLETE: CPT | Performed by: INTERNAL MEDICINE

## 2025-01-13 RX ORDER — LORAZEPAM 0.5 MG/1
0.5 TABLET ORAL
Status: COMPLETED | OUTPATIENT
Start: 2025-01-13 | End: 2025-01-13

## 2025-01-13 RX ADMIN — ROSUVASTATIN CALCIUM 40 MG: 40 TABLET, FILM COATED ORAL at 09:29

## 2025-01-13 RX ADMIN — LOSARTAN POTASSIUM 25 MG: 25 TABLET, FILM COATED ORAL at 09:29

## 2025-01-13 RX ADMIN — LORAZEPAM 0.5 MG: 0.5 TABLET ORAL at 14:03

## 2025-01-13 RX ADMIN — ENOXAPARIN SODIUM 30 MG: 100 INJECTION SUBCUTANEOUS at 09:29

## 2025-01-13 RX ADMIN — SODIUM CHLORIDE, PRESERVATIVE FREE 10 ML: 5 INJECTION INTRAVENOUS at 09:30

## 2025-01-13 RX ADMIN — ASPIRIN 81 MG: 81 TABLET, COATED ORAL at 09:29

## 2025-01-13 ASSESSMENT — PAIN SCALES - GENERAL: PAINLEVEL_OUTOF10: 0

## 2025-01-13 NOTE — CONSULTS
Neurology Consult Note  Reason for Consult: presyncope    Chief complaint: passed out    Anjali Cabrera MD asked me to see Andrews Sanchez in consultation for evaluation of presyncope    History of Present Illness:  Andrews Sanchez is a 69 y.o. male who presents with LOC.     I obtained my information via interview w/ the patient, supplemented by chart review.    The patient had been out to eat.  He seemed to have a bit of trouble as he was preparing to pay the bill.  He denies any preceding symptoms.  He wife says he just kind of put his head down and initially his eyes were open but subsequently closed.  He was not responding.  There was no convulsive behavior.  No tongue biting or incontinence.  He was placed on the floor then turned on his side when he vomited multiple times.  He seemed to come to right after he vomited and was not overtly confused.  He was subsequently transported to the ED to be evaluated.     BP was 132/73.  CT head negative.  CTA head/neck negative.  No neurologic deficits.    Today he feels back to normal.  No specific neurologic complaints.      He has never experienced anything like this in the past.      Medical History:  Past Medical History:   Diagnosis Date    External thrombosed hemorrhoids 2009    Hypertension     Sleep apnea 2014    on c-pap     Past Surgical History:   Procedure Laterality Date    COLONOSCOPY      COLONOSCOPY N/A 9/27/2021    COLORECTAL CANCER SCREENING, NOT HIGH RISK performed by Don Garcia MD at [facility] ENDOSCOPY    EYE SURGERY Bilateral 2018    JOINT REPLACEMENT       Scheduled Meds:   aspirin  81 mg Oral Daily    losartan  25 mg Oral Daily    rosuvastatin  40 mg Oral Daily    sodium chloride flush  5-40 mL IntraVENous 2 times per day    enoxaparin  30 mg SubCUTAneous BID     Medications Prior to Admission:   olmesartan (BENICAR) 20 MG tablet, Take 1 tablet by mouth daily  albuterol sulfate  (90 Base) MCG/ACT inhaler, Inhale 2 puffs into the lungs 
Her/She
or lesions.   Pysch: Normal mood and affect. Alert and oriented x 4.   Neurologic: Normal gross motor and sensory exam.       Labs     CBC:   Lab Results   Component Value Date/Time    WBC 5.2 01/11/2025 09:28 PM    RBC 4.42 01/11/2025 09:28 PM    HGB 14.2 01/11/2025 09:28 PM    HCT 42.0 01/11/2025 09:28 PM    MCV 95.1 01/11/2025 09:28 PM    RDW 14.2 01/11/2025 09:28 PM     01/11/2025 09:28 PM     CMP:  Lab Results   Component Value Date/Time     01/11/2025 09:28 PM    K 3.8 01/11/2025 09:28 PM     01/11/2025 09:28 PM    CO2 26 01/11/2025 09:28 PM    BUN 22 01/11/2025 09:28 PM    CREATININE 1.0 01/11/2025 09:28 PM    AGRATIO 2.1 01/11/2025 09:28 PM    LABGLOM 81 01/11/2025 09:28 PM    GLUCOSE 149 01/11/2025 09:28 PM    CALCIUM 8.7 01/11/2025 09:28 PM    BILITOT <0.2 01/11/2025 09:28 PM    ALKPHOS 42 01/11/2025 09:28 PM    AST 27 01/11/2025 09:28 PM    ALT 26 01/11/2025 09:28 PM     PT/INR:  No results found for: \"PTINR\"  HgBA1c:  Lab Results   Component Value Date    LABA1C 5.7 (H) 02/06/2023     No results found for: \"CKTOTAL\", \"CKMB\", \"CKMBINDEX\", \"TROPONINI\"    Cardiac Data     EKG: Personally interpreted.  1/11/2025.  Normal sinus rhythm with first-degree AV block.  Otherwise normal ECG    Coronary calcium score: 8/10/2018 ([facility])  Total calcium score 133    Telemetry: Personally interpreted.  Sinus.    Assessment and Plan   1) Syncope and collapse.  Uncertain etiology.  No rhythm issues on telemetry.  ECG without  advanced heart block.  Echocardiogram completed and has not been reviewed but significant valvular disease seems unlikely.  Discussed observation versus outpatient event monitor.  Patient is comfortable with continued observation.  Patient denies typical prodrome of a vasovagal event but it did occur after a large meal and vomited after the event.    2) Coronary artery calcification.  Continue medical management risk factor modification including the use of high intensity statin

## 2025-01-13 NOTE — PROGRESS NOTES
Physical Therapy      Andrews Sanchez  1/13/2025    -chart reviewed   -patient has been up ad huyen in room   -nursing concurs   -will sign off     Electronically signed by GELY MAHMOOD PT on 1/13/2025 at 10:46 AM

## 2025-01-13 NOTE — PROGRESS NOTES
Patient to echo. MRI pre meds given. RN tried to call MRI to let them know patient will go to echo then to MRI.

## 2025-01-13 NOTE — PROGRESS NOTES
Patient up to chair, A&O Xx4. VSS. PIV flushed, dressing CDI at this time. Patient denies pain. All other AM medications taken whole without complaint (see eMAR).  Patient tolerating PO intake and appetite adequate. No other needs verbalized at this time. Standard safety precautions in place and call light within reach.

## 2025-01-13 NOTE — CARE COORDINATION
Reviewed chart and spoke with bedside RN,  patient is alert and oriented, independent, has insurance and a PCP.  Please advise Case Management if patient will have discharge planning needs.     Electronically signed by HALEY Altamirano, DULCEW, Case Management on 1/13/2025 at 4:39 PM  [place] [phone]

## 2025-01-13 NOTE — PROGRESS NOTES
Occupational Therapy  Andrews Sanchez  [id number]  [id number]    OT orders received and pt's chart reviewed. Therapist to pt's room, pt reporting he has been UAL in the room, completed toileting/grooming tasks IND. Denies concerns regarding functional status at this time and upon D/C to home. No acute OT needs identified. Will sign off.     Tanya Duran, OTR/L [id number]

## 2025-01-13 NOTE — PLAN OF CARE
Problem: Discharge Planning  Goal: Discharge to home or other facility with appropriate resources  1/13/2025 1044 by Kathy Muniz RN  Outcome: Progressing  Flowsheets (Taken 1/13/2025 0930)  Discharge to home or other facility with appropriate resources: Identify barriers to discharge with patient and caregiver  1/13/2025 0154 by Elizabeth Arroyo RN  Outcome: Progressing  Flowsheets (Taken 1/13/2025 0154)  Discharge to home or other facility with appropriate resources:   Identify barriers to discharge with patient and caregiver   Identify discharge learning needs (meds, wound care, etc)     Problem: Pain  Goal: Verbalizes/displays adequate comfort level or baseline comfort level  1/13/2025 1044 by Kathy Muniz RN  Outcome: Progressing  1/13/2025 0154 by Elizabeth Arroyo RN  Outcome: Progressing  Flowsheets (Taken 1/13/2025 0154)  Verbalizes/displays adequate comfort level or baseline comfort level:   Encourage patient to monitor pain and request assistance   Assess pain using appropriate pain scale     Problem: ABCDS Injury Assessment  Goal: Absence of physical injury  1/13/2025 1044 by Kathy Muniz, RN  Outcome: Progressing  1/13/2025 0154 by Elizabeth Arroyo RN  Outcome: Progressing  Flowsheets (Taken 1/13/2025 0154)  Absence of Physical Injury: Implement safety measures based on patient assessment

## 2025-01-13 NOTE — PROGRESS NOTES
Pt's IV line removed without complications. Discussed d/c instructions with patient, given opportunity to ask questions, and provided new medication education with side effects. Follow up appointment information included in d/c instructions. Pt verbalized understanding of d/c instructions. Patient was discharged to home with all belongings and ambulated independently outside.    Kathy Muniz RN

## 2025-01-13 NOTE — DISCHARGE INSTRUCTIONS
- please schedule an appointment to see your PCP  - please schedule an appointment to see your cardiologist   - please go to lab in one week for blood work  - please take medications as prescribed

## 2025-01-13 NOTE — PLAN OF CARE
Problem: Pain  Goal: Verbalizes/displays adequate comfort level or baseline comfort level  Outcome: Progressing  Flowsheets (Taken 1/13/2025 0154)  Verbalizes/displays adequate comfort level or baseline comfort level:   Encourage patient to monitor pain and request assistance   Assess pain using appropriate pain scale     Problem: ABCDS Injury Assessment  Goal: Absence of physical injury  Outcome: Progressing  Flowsheets (Taken 1/13/2025 0154)  Absence of Physical Injury: Implement safety measures based on patient assessment

## 2025-01-14 NOTE — DISCHARGE SUMMARY
the progress note for today's visit.  Denies any lightheaded or dizziness. Cleared for discharge by cardiology.        Physical Exam  Vitals:   Vitals:    01/13/25 1602   BP: (!) 143/88   Pulse: 61   Resp: 16   Temp: 98.3 °F (36.8 °C)   SpO2: 96%       General: NAD  Eyes: EOMI  ENT: neck supple  Cardiovascular: Regular rate.  Respiratory: Clear to auscultation  Gastrointestinal: Soft, non tender  Genitourinary: no suprapubic tenderness  Musculoskeletal: No edema  Skin: warm, dry  Neuro: Alert.  Psych: Mood appropriate.     The patient expressed appropriate understanding of and agreement with the discharge recommendations, medications, and plan.     Consults this admission:  IP CONSULT TO NEUROLOGY  IP CONSULT TO CARDIOLOGY      Discharge Instruction:   Handoff to PCP:     Follow up appointments: cardiology  Primary care physician: Jeff Oneil MD      Diet:  cardiac diet   Activity: activity as tolerated  Disposition: Discharged to:    [x]Home, [][facility], []SNF, []Acute Rehab, []Hospice   Condition on discharge: Stable    Discharge Medications:        Medication List        CONTINUE taking these medications      albuterol sulfate  (90 Base) MCG/ACT inhaler  Commonly known as: PROVENTIL;VENTOLIN;PROAIR     aspirin 81 MG EC tablet     metFORMIN 500 MG extended release tablet  Commonly known as: GLUCOPHAGE-XR     olmesartan 20 MG tablet  Commonly known as: BENICAR     rosuvastatin 40 MG tablet  Commonly known as: CRESTOR     Trulicity 4.5 MG/0.5ML Soaj  Generic drug: Dulaglutide              Objective Findings at Discharge:       BMP/CBC  Recent Labs     01/11/25  2128      K 3.8      CO2 26   BUN 22*   CREATININE 1.0   WBC 5.2   HCT 42.0          IMAGING:      Additional Information: Patient seen and examined day of discharge. For more information regarding patient's care please contact [facility] medical records [phone]    Discharge Time of 35 minutes    Electronically signed

## 2025-07-07 ENCOUNTER — HOSPITAL ENCOUNTER (INPATIENT)
Age: 70
LOS: 3 days | Discharge: HOME | End: 2025-07-10
Attending: HOSPITALIST | Admitting: HOSPITALIST
Payer: MEDICARE

## 2025-07-07 ENCOUNTER — APPOINTMENT (OUTPATIENT)
Dept: ULTRASOUND IMAGING | Age: 70
End: 2025-07-07
Payer: MEDICARE

## 2025-07-07 ENCOUNTER — APPOINTMENT (OUTPATIENT)
Dept: GENERAL RADIOLOGY | Age: 70
End: 2025-07-07
Payer: MEDICARE

## 2025-07-07 DIAGNOSIS — R17 TOTAL BILIRUBIN, ELEVATED: ICD-10-CM

## 2025-07-07 DIAGNOSIS — R10.11 ABDOMINAL PAIN, RIGHT UPPER QUADRANT: Primary | ICD-10-CM

## 2025-07-07 DIAGNOSIS — A41.9 SEPTICEMIA (HCC): ICD-10-CM

## 2025-07-07 DIAGNOSIS — R10.13 EPIGASTRIC ABDOMINAL PAIN: ICD-10-CM

## 2025-07-07 DIAGNOSIS — R74.01 TRANSAMINITIS: ICD-10-CM

## 2025-07-07 PROBLEM — K81.0 ACUTE CHOLECYSTITIS: Status: ACTIVE | Noted: 2025-07-07

## 2025-07-07 LAB
ALBUMIN SERPL-MCNC: 4 G/DL (ref 3.4–5)
ALP SERPL-CCNC: 126 U/L (ref 40–129)
ALT SERPL-CCNC: 462 U/L (ref 10–40)
ANION GAP SERPL CALCULATED.3IONS-SCNC: 14 MMOL/L (ref 3–16)
AST SERPL-CCNC: 182 U/L (ref 15–37)
BASOPHILS # BLD: 0 K/UL (ref 0–0.2)
BASOPHILS NFR BLD: 0.2 %
BILIRUB DIRECT SERPL-MCNC: 2.5 MG/DL (ref 0–0.3)
BILIRUB INDIRECT SERPL-MCNC: 0.9 MG/DL (ref 0–1)
BILIRUB SERPL-MCNC: 3.4 MG/DL (ref 0–1)
BUN SERPL-MCNC: 16 MG/DL (ref 7–20)
CALCIUM SERPL-MCNC: 9.2 MG/DL (ref 8.3–10.6)
CHLORIDE SERPL-SCNC: 103 MMOL/L (ref 99–110)
CO2 SERPL-SCNC: 21 MMOL/L (ref 21–32)
CREAT SERPL-MCNC: 1.1 MG/DL (ref 0.8–1.3)
DEPRECATED RDW RBC AUTO: 14.3 % (ref 12.4–15.4)
EOSINOPHIL # BLD: 0 K/UL (ref 0–0.6)
EOSINOPHIL NFR BLD: 1 %
GFR SERPLBLD CREATININE-BSD FMLA CKD-EPI: 72 ML/MIN/{1.73_M2}
GLUCOSE SERPL-MCNC: 118 MG/DL (ref 70–99)
HCT VFR BLD AUTO: 42.9 % (ref 40.5–52.5)
HGB BLD-MCNC: 14.7 G/DL (ref 13.5–17.5)
LACTATE BLDV-SCNC: 1.4 MMOL/L (ref 0.4–1.9)
LYMPHOCYTES # BLD: 0.1 K/UL (ref 1–5.1)
LYMPHOCYTES NFR BLD: 1.8 %
MCH RBC QN AUTO: 32.1 PG (ref 26–34)
MCHC RBC AUTO-ENTMCNC: 34.4 G/DL (ref 31–36)
MCV RBC AUTO: 93.3 FL (ref 80–100)
MONOCYTES # BLD: 0 K/UL (ref 0–1.3)
MONOCYTES NFR BLD: 1.3 %
NEUTROPHILS # BLD: 3.7 K/UL (ref 1.7–7.7)
NEUTROPHILS NFR BLD: 95.7 %
PLATELET # BLD AUTO: 127 K/UL (ref 135–450)
PMV BLD AUTO: 8.1 FL (ref 5–10.5)
POTASSIUM SERPL-SCNC: 3.7 MMOL/L (ref 3.5–5.1)
PROT SERPL-MCNC: 6.8 G/DL (ref 6.4–8.2)
RBC # BLD AUTO: 4.6 M/UL (ref 4.2–5.9)
SODIUM SERPL-SCNC: 138 MMOL/L (ref 136–145)
WBC # BLD AUTO: 3.8 K/UL (ref 4–11)

## 2025-07-07 PROCEDURE — 93005 ELECTROCARDIOGRAM TRACING: CPT | Performed by: PHYSICIAN ASSISTANT

## 2025-07-07 PROCEDURE — 87040 BLOOD CULTURE FOR BACTERIA: CPT

## 2025-07-07 PROCEDURE — 96375 TX/PRO/DX INJ NEW DRUG ADDON: CPT

## 2025-07-07 PROCEDURE — 76705 ECHO EXAM OF ABDOMEN: CPT

## 2025-07-07 PROCEDURE — 6360000002 HC RX W HCPCS: Performed by: PHYSICIAN ASSISTANT

## 2025-07-07 PROCEDURE — 80076 HEPATIC FUNCTION PANEL: CPT

## 2025-07-07 PROCEDURE — 99285 EMERGENCY DEPT VISIT HI MDM: CPT

## 2025-07-07 PROCEDURE — 2580000003 HC RX 258: Performed by: PHYSICIAN ASSISTANT

## 2025-07-07 PROCEDURE — 87186 SC STD MICRODIL/AGAR DIL: CPT

## 2025-07-07 PROCEDURE — 80048 BASIC METABOLIC PNL TOTAL CA: CPT

## 2025-07-07 PROCEDURE — 71045 X-RAY EXAM CHEST 1 VIEW: CPT

## 2025-07-07 PROCEDURE — 83605 ASSAY OF LACTIC ACID: CPT

## 2025-07-07 PROCEDURE — 85025 COMPLETE CBC W/AUTO DIFF WBC: CPT

## 2025-07-07 PROCEDURE — 1200000000 HC SEMI PRIVATE

## 2025-07-07 PROCEDURE — 96361 HYDRATE IV INFUSION ADD-ON: CPT

## 2025-07-07 PROCEDURE — 87150 DNA/RNA AMPLIFIED PROBE: CPT

## 2025-07-07 PROCEDURE — 96365 THER/PROPH/DIAG IV INF INIT: CPT

## 2025-07-07 RX ORDER — POTASSIUM CHLORIDE 7.45 MG/ML
10 INJECTION INTRAVENOUS PRN
Status: DISCONTINUED | OUTPATIENT
Start: 2025-07-07 | End: 2025-07-10 | Stop reason: HOSPADM

## 2025-07-07 RX ORDER — KETOROLAC TROMETHAMINE 15 MG/ML
15 INJECTION, SOLUTION INTRAMUSCULAR; INTRAVENOUS
Status: COMPLETED | OUTPATIENT
Start: 2025-07-07 | End: 2025-07-07

## 2025-07-07 RX ORDER — DICYCLOMINE HYDROCHLORIDE 10 MG/1
10 CAPSULE ORAL 3 TIMES DAILY PRN
COMMUNITY

## 2025-07-07 RX ORDER — DEXTROSE MONOHYDRATE AND SODIUM CHLORIDE 5; .45 G/100ML; G/100ML
INJECTION, SOLUTION INTRAVENOUS CONTINUOUS
Status: ACTIVE | OUTPATIENT
Start: 2025-07-08 | End: 2025-07-08

## 2025-07-07 RX ORDER — ACETAMINOPHEN 650 MG/1
650 SUPPOSITORY RECTAL EVERY 6 HOURS PRN
Status: DISCONTINUED | OUTPATIENT
Start: 2025-07-07 | End: 2025-07-10 | Stop reason: HOSPADM

## 2025-07-07 RX ORDER — MELOXICAM 7.5 MG/1
7.5 TABLET ORAL DAILY
COMMUNITY

## 2025-07-07 RX ORDER — 0.9 % SODIUM CHLORIDE 0.9 %
1000 INTRAVENOUS SOLUTION INTRAVENOUS ONCE
Status: COMPLETED | OUTPATIENT
Start: 2025-07-07 | End: 2025-07-07

## 2025-07-07 RX ORDER — SILDENAFIL 100 MG/1
100 TABLET, FILM COATED ORAL PRN
COMMUNITY

## 2025-07-07 RX ORDER — ONDANSETRON 2 MG/ML
4 INJECTION INTRAMUSCULAR; INTRAVENOUS ONCE
Status: DISCONTINUED | OUTPATIENT
Start: 2025-07-07 | End: 2025-07-10 | Stop reason: HOSPADM

## 2025-07-07 RX ORDER — POTASSIUM CHLORIDE 1500 MG/1
40 TABLET, EXTENDED RELEASE ORAL PRN
Status: DISCONTINUED | OUTPATIENT
Start: 2025-07-07 | End: 2025-07-10 | Stop reason: HOSPADM

## 2025-07-07 RX ORDER — SODIUM CHLORIDE 9 MG/ML
INJECTION, SOLUTION INTRAVENOUS PRN
Status: DISCONTINUED | OUTPATIENT
Start: 2025-07-07 | End: 2025-07-10 | Stop reason: HOSPADM

## 2025-07-07 RX ORDER — SODIUM CHLORIDE 0.9 % (FLUSH) 0.9 %
5-40 SYRINGE (ML) INJECTION EVERY 12 HOURS SCHEDULED
Status: DISCONTINUED | OUTPATIENT
Start: 2025-07-08 | End: 2025-07-10 | Stop reason: HOSPADM

## 2025-07-07 RX ORDER — SODIUM CHLORIDE 9 MG/ML
INJECTION, SOLUTION INTRAVENOUS CONTINUOUS
Status: ACTIVE | OUTPATIENT
Start: 2025-07-08 | End: 2025-07-08

## 2025-07-07 RX ORDER — OXYCODONE HYDROCHLORIDE 5 MG/1
5 TABLET ORAL EVERY 6 HOURS PRN
Status: ON HOLD | COMMUNITY
Start: 2025-07-06 | End: 2025-07-10 | Stop reason: HOSPADM

## 2025-07-07 RX ORDER — LOSARTAN POTASSIUM 50 MG/1
50 TABLET ORAL DAILY
Status: DISCONTINUED | OUTPATIENT
Start: 2025-07-08 | End: 2025-07-10 | Stop reason: HOSPADM

## 2025-07-07 RX ORDER — SODIUM CHLORIDE 0.9 % (FLUSH) 0.9 %
5-40 SYRINGE (ML) INJECTION PRN
Status: DISCONTINUED | OUTPATIENT
Start: 2025-07-07 | End: 2025-07-10 | Stop reason: HOSPADM

## 2025-07-07 RX ORDER — VANCOMYCIN 2 G/400ML
2000 INJECTION, SOLUTION INTRAVENOUS ONCE
Status: COMPLETED | OUTPATIENT
Start: 2025-07-07 | End: 2025-07-08

## 2025-07-07 RX ORDER — DICYCLOMINE HYDROCHLORIDE 10 MG/1
10 CAPSULE ORAL 3 TIMES DAILY PRN
Status: DISCONTINUED | OUTPATIENT
Start: 2025-07-07 | End: 2025-07-10 | Stop reason: HOSPADM

## 2025-07-07 RX ORDER — ONDANSETRON 4 MG/1
4 TABLET, FILM COATED ORAL EVERY 8 HOURS PRN
COMMUNITY

## 2025-07-07 RX ORDER — ASPIRIN 81 MG/1
81 TABLET ORAL DAILY
Status: DISCONTINUED | OUTPATIENT
Start: 2025-07-08 | End: 2025-07-10 | Stop reason: HOSPADM

## 2025-07-07 RX ORDER — ACETAMINOPHEN 325 MG/1
650 TABLET ORAL EVERY 6 HOURS PRN
Status: DISCONTINUED | OUTPATIENT
Start: 2025-07-07 | End: 2025-07-10 | Stop reason: HOSPADM

## 2025-07-07 RX ORDER — MORPHINE SULFATE 2 MG/ML
2 INJECTION, SOLUTION INTRAMUSCULAR; INTRAVENOUS ONCE
Refills: 0 | Status: DISCONTINUED | OUTPATIENT
Start: 2025-07-07 | End: 2025-07-07

## 2025-07-07 RX ORDER — MAGNESIUM SULFATE IN WATER 40 MG/ML
2000 INJECTION, SOLUTION INTRAVENOUS PRN
Status: DISCONTINUED | OUTPATIENT
Start: 2025-07-07 | End: 2025-07-10 | Stop reason: HOSPADM

## 2025-07-07 RX ORDER — POLYETHYLENE GLYCOL 3350 17 G/17G
17 POWDER, FOR SOLUTION ORAL DAILY PRN
Status: DISCONTINUED | OUTPATIENT
Start: 2025-07-07 | End: 2025-07-10 | Stop reason: HOSPADM

## 2025-07-07 RX ORDER — ONDANSETRON 4 MG/1
4 TABLET, ORALLY DISINTEGRATING ORAL EVERY 8 HOURS PRN
Status: DISCONTINUED | OUTPATIENT
Start: 2025-07-07 | End: 2025-07-10 | Stop reason: HOSPADM

## 2025-07-07 RX ORDER — ONDANSETRON 2 MG/ML
4 INJECTION INTRAMUSCULAR; INTRAVENOUS EVERY 6 HOURS PRN
Status: DISCONTINUED | OUTPATIENT
Start: 2025-07-07 | End: 2025-07-10 | Stop reason: HOSPADM

## 2025-07-07 RX ORDER — ACETAMINOPHEN 500 MG
1000 TABLET ORAL ONCE
Status: DISCONTINUED | OUTPATIENT
Start: 2025-07-07 | End: 2025-07-10 | Stop reason: HOSPADM

## 2025-07-07 RX ADMIN — SODIUM CHLORIDE 1000 ML: 0.9 INJECTION, SOLUTION INTRAVENOUS at 21:26

## 2025-07-07 RX ADMIN — KETOROLAC TROMETHAMINE 15 MG: 15 INJECTION, SOLUTION INTRAMUSCULAR; INTRAVENOUS at 23:01

## 2025-07-07 RX ADMIN — SODIUM CHLORIDE 1000 ML: 0.9 INJECTION, SOLUTION INTRAVENOUS at 23:01

## 2025-07-07 RX ADMIN — VANCOMYCIN 2000 MG: 2 INJECTION, SOLUTION INTRAVENOUS at 23:01

## 2025-07-07 RX ADMIN — CEFEPIME 1000 MG: 1 INJECTION, POWDER, FOR SOLUTION INTRAMUSCULAR; INTRAVENOUS at 22:00

## 2025-07-07 ASSESSMENT — ENCOUNTER SYMPTOMS
VOMITING: 0
NAUSEA: 0
ABDOMINAL PAIN: 1

## 2025-07-07 ASSESSMENT — PAIN - FUNCTIONAL ASSESSMENT: PAIN_FUNCTIONAL_ASSESSMENT: ACTIVITIES ARE NOT PREVENTED

## 2025-07-07 ASSESSMENT — PAIN DESCRIPTION - ONSET: ONSET: PROGRESSIVE

## 2025-07-07 ASSESSMENT — PAIN DESCRIPTION - ORIENTATION: ORIENTATION: UPPER

## 2025-07-07 ASSESSMENT — PAIN SCALES - GENERAL: PAINLEVEL_OUTOF10: 7

## 2025-07-07 ASSESSMENT — PAIN DESCRIPTION - DESCRIPTORS: DESCRIPTORS: DISCOMFORT

## 2025-07-07 ASSESSMENT — PAIN DESCRIPTION - FREQUENCY: FREQUENCY: CONTINUOUS

## 2025-07-07 ASSESSMENT — PAIN DESCRIPTION - LOCATION: LOCATION: ABDOMEN

## 2025-07-07 ASSESSMENT — PAIN DESCRIPTION - PAIN TYPE: TYPE: ACUTE PAIN

## 2025-07-08 ENCOUNTER — APPOINTMENT (OUTPATIENT)
Dept: CT IMAGING | Age: 70
End: 2025-07-08
Payer: MEDICARE

## 2025-07-08 PROBLEM — E66.812 CLASS II OBESITY: Status: ACTIVE | Noted: 2025-07-08

## 2025-07-08 PROBLEM — K83.09 ACUTE CHOLANGITIS (HCC): Status: ACTIVE | Noted: 2025-07-08

## 2025-07-08 PROBLEM — R10.11 ABDOMINAL PAIN, RIGHT UPPER QUADRANT: Status: ACTIVE | Noted: 2025-07-08

## 2025-07-08 PROBLEM — K80.50 BILIARY COLIC: Status: ACTIVE | Noted: 2025-07-08

## 2025-07-08 PROBLEM — A41.9 SEPSIS (HCC): Status: ACTIVE | Noted: 2025-07-08

## 2025-07-08 LAB
ALBUMIN SERPL-MCNC: 3.3 G/DL (ref 3.4–5)
ALBUMIN/GLOB SERPL: 1.4 {RATIO} (ref 1.1–2.2)
ALP SERPL-CCNC: 94 U/L (ref 40–129)
ALT SERPL-CCNC: 316 U/L (ref 10–40)
AMORPH SED URNS QL MICRO: ABNORMAL /HPF
ANION GAP SERPL CALCULATED.3IONS-SCNC: 12 MMOL/L (ref 3–16)
AST SERPL-CCNC: 107 U/L (ref 15–37)
BACTERIA URNS QL MICRO: ABNORMAL /HPF
BASOPHILS # BLD: 0 K/UL (ref 0–0.2)
BASOPHILS NFR BLD: 0.1 %
BILIRUB SERPL-MCNC: 3.7 MG/DL (ref 0–1)
BILIRUB UR QL STRIP.AUTO: ABNORMAL
BUN SERPL-MCNC: 15 MG/DL (ref 7–20)
CALCIUM SERPL-MCNC: 7.9 MG/DL (ref 8.3–10.6)
CHLORIDE SERPL-SCNC: 105 MMOL/L (ref 99–110)
CLARITY UR: CLEAR
CO2 SERPL-SCNC: 21 MMOL/L (ref 21–32)
COLOR UR: ABNORMAL
CREAT SERPL-MCNC: 1.2 MG/DL (ref 0.8–1.3)
DEPRECATED RDW RBC AUTO: 14.2 % (ref 12.4–15.4)
EKG ATRIAL RATE: 108 BPM
EKG DIAGNOSIS: NORMAL
EKG P AXIS: 45 DEGREES
EKG P-R INTERVAL: 166 MS
EKG Q-T INTERVAL: 314 MS
EKG QRS DURATION: 86 MS
EKG QTC CALCULATION (BAZETT): 420 MS
EKG R AXIS: 69 DEGREES
EKG T AXIS: 54 DEGREES
EKG VENTRICULAR RATE: 108 BPM
EOSINOPHIL # BLD: 0 K/UL (ref 0–0.6)
EOSINOPHIL NFR BLD: 0.3 %
EPI CELLS #/AREA URNS HPF: ABNORMAL /HPF (ref 0–5)
GFR SERPLBLD CREATININE-BSD FMLA CKD-EPI: 65 ML/MIN/{1.73_M2}
GLUCOSE BLD-MCNC: 106 MG/DL (ref 70–99)
GLUCOSE BLD-MCNC: 126 MG/DL (ref 70–99)
GLUCOSE BLD-MCNC: 130 MG/DL (ref 70–99)
GLUCOSE SERPL-MCNC: 125 MG/DL (ref 70–99)
GLUCOSE UR STRIP.AUTO-MCNC: 100 MG/DL
HAV IGM SERPL QL IA: NORMAL
HBV CORE IGM SERPL QL IA: NORMAL
HBV SURFACE AG SERPL QL IA: NORMAL
HCT VFR BLD AUTO: 37.4 % (ref 40.5–52.5)
HCV AB SERPL QL IA: NORMAL
HGB BLD-MCNC: 12.5 G/DL (ref 13.5–17.5)
HGB UR QL STRIP.AUTO: ABNORMAL
HYALINE CASTS #/AREA URNS LPF: ABNORMAL /LPF (ref 0–2)
INR PPP: 1.35 (ref 0.86–1.14)
KETONES UR STRIP.AUTO-MCNC: NEGATIVE MG/DL
LACTATE BLDV-SCNC: 1.3 MMOL/L (ref 0.4–1.9)
LEUKOCYTE ESTERASE UR QL STRIP.AUTO: NEGATIVE
LYMPHOCYTES # BLD: 0.3 K/UL (ref 1–5.1)
LYMPHOCYTES NFR BLD: 3.5 %
MCH RBC QN AUTO: 31.4 PG (ref 26–34)
MCHC RBC AUTO-ENTMCNC: 33.3 G/DL (ref 31–36)
MCV RBC AUTO: 94.3 FL (ref 80–100)
MONOCYTES # BLD: 0.4 K/UL (ref 0–1.3)
MONOCYTES NFR BLD: 4.3 %
MUCOUS THREADS #/AREA URNS LPF: ABNORMAL /LPF
NEUTROPHILS # BLD: 8 K/UL (ref 1.7–7.7)
NEUTROPHILS NFR BLD: 91.8 %
NITRITE UR QL STRIP.AUTO: NEGATIVE
PERFORMED ON: ABNORMAL
PH UR STRIP.AUTO: 6 [PH] (ref 5–8)
PLATELET # BLD AUTO: 107 K/UL (ref 135–450)
PMV BLD AUTO: 8.2 FL (ref 5–10.5)
POTASSIUM SERPL-SCNC: 3.9 MMOL/L (ref 3.5–5.1)
PROT SERPL-MCNC: 5.6 G/DL (ref 6.4–8.2)
PROT UR STRIP.AUTO-MCNC: 100 MG/DL
PROTHROMBIN TIME: 16.9 SEC (ref 12.1–14.9)
RBC # BLD AUTO: 3.97 M/UL (ref 4.2–5.9)
RBC #/AREA URNS HPF: ABNORMAL /HPF (ref 0–4)
REPORT: NORMAL
SODIUM SERPL-SCNC: 138 MMOL/L (ref 136–145)
SP GR UR STRIP.AUTO: 1.02 (ref 1–1.03)
UA DIPSTICK W REFLEX MICRO PNL UR: YES
URN SPEC COLLECT METH UR: ABNORMAL
UROBILINOGEN UR STRIP-ACNC: 4 E.U./DL
WBC # BLD AUTO: 8.7 K/UL (ref 4–11)
WBC #/AREA URNS HPF: ABNORMAL /HPF (ref 0–5)

## 2025-07-08 PROCEDURE — 85025 COMPLETE CBC W/AUTO DIFF WBC: CPT

## 2025-07-08 PROCEDURE — 83605 ASSAY OF LACTIC ACID: CPT

## 2025-07-08 PROCEDURE — 6360000002 HC RX W HCPCS: Performed by: HOSPITALIST

## 2025-07-08 PROCEDURE — 99223 1ST HOSP IP/OBS HIGH 75: CPT | Performed by: STUDENT IN AN ORGANIZED HEALTH CARE EDUCATION/TRAINING PROGRAM

## 2025-07-08 PROCEDURE — 2500000003 HC RX 250 WO HCPCS: Performed by: HOSPITALIST

## 2025-07-08 PROCEDURE — APPSS15 APP SPLIT SHARED TIME 0-15 MINUTES: Performed by: PHYSICIAN ASSISTANT

## 2025-07-08 PROCEDURE — 2580000003 HC RX 258: Performed by: HOSPITALIST

## 2025-07-08 PROCEDURE — 6370000000 HC RX 637 (ALT 250 FOR IP): Performed by: HOSPITALIST

## 2025-07-08 PROCEDURE — 80053 COMPREHEN METABOLIC PANEL: CPT

## 2025-07-08 PROCEDURE — 93010 ELECTROCARDIOGRAM REPORT: CPT | Performed by: INTERNAL MEDICINE

## 2025-07-08 PROCEDURE — 36415 COLL VENOUS BLD VENIPUNCTURE: CPT

## 2025-07-08 PROCEDURE — 81001 URINALYSIS AUTO W/SCOPE: CPT

## 2025-07-08 PROCEDURE — 85610 PROTHROMBIN TIME: CPT

## 2025-07-08 PROCEDURE — APPNB15 APP NON BILLABLE TIME 0-15 MINS: Performed by: PHYSICIAN ASSISTANT

## 2025-07-08 PROCEDURE — 1200000000 HC SEMI PRIVATE

## 2025-07-08 PROCEDURE — 94760 N-INVAS EAR/PLS OXIMETRY 1: CPT

## 2025-07-08 PROCEDURE — 6360000004 HC RX CONTRAST MEDICATION: Performed by: INTERNAL MEDICINE

## 2025-07-08 PROCEDURE — 74177 CT ABD & PELVIS W/CONTRAST: CPT

## 2025-07-08 PROCEDURE — 80074 ACUTE HEPATITIS PANEL: CPT

## 2025-07-08 RX ORDER — DEXTROSE MONOHYDRATE 100 MG/ML
INJECTION, SOLUTION INTRAVENOUS CONTINUOUS PRN
Status: DISCONTINUED | OUTPATIENT
Start: 2025-07-08 | End: 2025-07-10 | Stop reason: HOSPADM

## 2025-07-08 RX ORDER — IOPAMIDOL 612 MG/ML
50 INJECTION, SOLUTION INTRAVASCULAR
Status: COMPLETED | OUTPATIENT
Start: 2025-07-08 | End: 2025-07-08

## 2025-07-08 RX ORDER — GLUCAGON 1 MG/ML
1 KIT INJECTION PRN
Status: DISCONTINUED | OUTPATIENT
Start: 2025-07-08 | End: 2025-07-10 | Stop reason: HOSPADM

## 2025-07-08 RX ORDER — INSULIN LISPRO 100 [IU]/ML
0-4 INJECTION, SOLUTION INTRAVENOUS; SUBCUTANEOUS EVERY 6 HOURS SCHEDULED
Status: DISCONTINUED | OUTPATIENT
Start: 2025-07-08 | End: 2025-07-10 | Stop reason: HOSPADM

## 2025-07-08 RX ORDER — IOPAMIDOL 755 MG/ML
75 INJECTION, SOLUTION INTRAVASCULAR
Status: COMPLETED | OUTPATIENT
Start: 2025-07-08 | End: 2025-07-08

## 2025-07-08 RX ADMIN — SODIUM CHLORIDE: 0.9 INJECTION, SOLUTION INTRAVENOUS at 02:59

## 2025-07-08 RX ADMIN — LOSARTAN POTASSIUM 50 MG: 50 TABLET, FILM COATED ORAL at 08:46

## 2025-07-08 RX ADMIN — SODIUM CHLORIDE: 0.9 INJECTION, SOLUTION INTRAVENOUS at 16:29

## 2025-07-08 RX ADMIN — IOPAMIDOL 50 ML: 612 INJECTION, SOLUTION INTRAVENOUS at 17:35

## 2025-07-08 RX ADMIN — SODIUM CHLORIDE, PRESERVATIVE FREE 10 ML: 5 INJECTION INTRAVENOUS at 09:51

## 2025-07-08 RX ADMIN — PIPERACILLIN AND TAZOBACTAM 3375 MG: 3; .375 INJECTION, POWDER, LYOPHILIZED, FOR SOLUTION INTRAVENOUS at 16:32

## 2025-07-08 RX ADMIN — ACETAMINOPHEN 650 MG: 325 TABLET ORAL at 21:14

## 2025-07-08 RX ADMIN — SODIUM CHLORIDE, PRESERVATIVE FREE 10 ML: 5 INJECTION INTRAVENOUS at 21:14

## 2025-07-08 RX ADMIN — PIPERACILLIN AND TAZOBACTAM 3375 MG: 3; .375 INJECTION, POWDER, LYOPHILIZED, FOR SOLUTION INTRAVENOUS at 08:51

## 2025-07-08 RX ADMIN — ASPIRIN 81 MG: 81 TABLET, DELAYED RELEASE ORAL at 08:46

## 2025-07-08 RX ADMIN — PIPERACILLIN AND TAZOBACTAM 3375 MG: 3; .375 INJECTION, POWDER, LYOPHILIZED, FOR SOLUTION INTRAVENOUS at 01:49

## 2025-07-08 RX ADMIN — IOPAMIDOL 75 ML: 755 INJECTION, SOLUTION INTRAVENOUS at 17:35

## 2025-07-08 ASSESSMENT — PAIN SCALES - GENERAL
PAINLEVEL_OUTOF10: 0
PAINLEVEL_OUTOF10: 0

## 2025-07-08 NOTE — H&P
V2.0  History and Physical      Name:  Andrews Sanchez /Age/Sex: 1955  (69 y.o. male)   MRN & CSN:  [id number] & [id number] Encounter Date/Time: 2025 11:42 PM EDT   Location:  [address] [place] [address] PCP: Jeff Oneil MD       Hospital Day: 1    Assessment and Plan:   Andrews Sanchez is a 69 y.o. male with a pmh of hypertension, class II obesity who presents with biliary disease likely acute cholangitis with Acute cholecystitis    Hospital Problems           Last Modified POA    * (Principal) Acute cholecystitis 2025 Yes    Acute cholangitis (HCC) 2025 Yes    Biliary colic 2025 Yes    Sepsis (HCC) 2025 Yes       Plan:  Patient meets SIRS criteria with heart rate of more than 90, white count of less than 4. Blood cx obtained; lactic acid less than 2. Zosyn ordered. Blood cx ordered at ED.    Obesity class II-BMI of 35.79 kg/m².  Complicates care.  Lifestyle modification recommended.  Trend CMP  NPO  Pain control with iv dilaudid as needed  IV zofran prn  IVF  GI consult  General Surgery consult.      Advance care planning:  Advanced care planning was discussed with patient for a total of  16 minutes.  Full code, DNR CC, DNR CCA, power of  and living will were discussed.  Patient elected to be a full code.   Disposition:   Current Living situation: Home  Expected Disposition: Home  Estimated D/C: 3 days    Diet Diet NPO   DVT Prophylaxis [] Lovenox, []  Heparin, [x] SCDs, [] Ambulation,  [] Eliquis, [] Xarelto, [] Coumadin   Code Status Full code   Surrogate Decision Maker/ POA Wife was at the bedside     Personally reviewed Lab Studies and Imaging     Discussed management of the case with  ED provider who recommended admission    EKG interpreted personally and results showed sinus tachycardia otherwise normal EKG    Imaging that was interpreted personally includes x-ray and results clear lungs            History from:     patient    History of Present Illness:     Chief Complaint: Right  of Transportation (Non-Medical): No    [facility]    Interpersonal Safety   Housing Stability: Low Risk  (1/12/2025)    Housing Stability Vital Sign     Unable to Pay for Housing in the Last Year: No     Number of Times Moved in the Last Year: 1     Homeless in the Last Year: No       Medications:   Medications:    acetaminophen  1,000 mg Oral Once    vancomycin  2,000 mg IntraVENous Once    ondansetron  4 mg IntraVENous Once      Infusions:   PRN Meds:      Labs      CBC:   Recent Labs     07/07/25 2119   WBC 3.8*   HGB 14.7   *     BMP:    Recent Labs     07/07/25 2119      K 3.7      CO2 21   BUN 16   CREATININE 1.1   GLUCOSE 118*     Hepatic:   Recent Labs     07/07/25 2119   *   *   BILITOT 3.4*   ALKPHOS 126     Lipids:   Lab Results   Component Value Date/Time    CHOL 101 02/06/2023 08:43 AM    HDL 33 02/06/2023 08:43 AM    TRIG 100 02/06/2023 08:43 AM     Hemoglobin A1C:   Lab Results   Component Value Date/Time    LABA1C 5.7 02/06/2023 08:43 AM     TSH:   Lab Results   Component Value Date/Time    TSH 3.290 02/06/2023 08:43 AM     Troponin: No results found for: \"TROPONINT\"  Lactic Acid: No results for input(s): \"LACTA\" in the last 72 hours.  BNP: No results for input(s): \"PROBNP\" in the last 72 hours.  UA:No results found for: \"NITRU\", \"COLORU\", \"PHUR\", \"LABCAST\", \"WBCUA\", \"RBCUA\", \"MUCUS\", \"TRICHOMONAS\", \"YEAST\", \"BACTERIA\", \"CLARITYU\", \"SPECGRAV\", \"LEUKOCYTESUR\", \"UROBILINOGEN\", \"BILIRUBINUR\", \"BLOODU\", \"GLUCOSEU\", \"KETUA\", \"AMORPHOUS\"  Urine Cultures: No results found for: \"LABURIN\"  Blood Cultures: No results found for: \"BC\"  No results found for: \"BLOODCULT2\"  Organism: No results found for: \"ORG\"    Imaging/Diagnostics Last 24 Hours   XR CHEST PORTABLE  Result Date: 7/7/2025  EXAMINATION: ONE XRAY VIEW OF THE CHEST 7/7/2025 9:16 pm COMPARISON: 01/11/2025 HISTORY: ORDERING SYSTEM PROVIDED HISTORY: shortness of breath

## 2025-07-08 NOTE — PROGRESS NOTES
Pt arrived via stretcher from ED to room 0057.  Heart monitor connected and verified with CM[facility]. VS, assessment, and admission complete. 4 eyes assessment complete. Pt oriented to unit and room. Call light and bedside table in reach. All questions answered. Pt resting quietly in bed with no complaints or voiced needs at this time.    Electronically signed by Blue Ponce RN on 7/8/2025 at 12:52 AM

## 2025-07-08 NOTE — PROGRESS NOTES
Pt has multiple order for continuous fluids. RN messaged on call provider for clarification. On call NP stated that pt has an A1C test in the morning and to run NS at this time, and to switch the fluids to 1/2 NS with 5% Dextrose after the test. Pt is NPO at this time, resting in bed with eyes closed.    Electronically signed by Blue Ponce RN on 7/8/2025 at 3:21 AM

## 2025-07-08 NOTE — PLAN OF CARE
Problem: Discharge Planning  Goal: Discharge to home or other facility with appropriate resources  Outcome: Progressing  Flowsheets (Taken 7/8/2025 0142)  Discharge to home or other facility with appropriate resources:   Identify barriers to discharge with patient and caregiver   Arrange for needed discharge resources and transportation as appropriate     Problem: Pain  Goal: Verbalizes/displays adequate comfort level or baseline comfort level  Outcome: Progressing  Flowsheets (Taken 7/8/2025 0226)  Verbalizes/displays adequate comfort level or baseline comfort level:   Encourage patient to monitor pain and request assistance   Assess pain using appropriate pain scale   Administer analgesics based on type and severity of pain and evaluate response   Implement non-pharmacological measures as appropriate and evaluate response   Consider cultural and social influences on pain and pain management   Notify Licensed Independent Practitioner if interventions unsuccessful or patient reports new pain     Problem: Gastrointestinal - Adult  Goal: Minimal or absence of nausea and vomiting  Outcome: Progressing  Goal: Maintains or returns to baseline bowel function  Outcome: Progressing  Goal: Maintains adequate nutritional intake  Outcome: Progressing

## 2025-07-08 NOTE — ED TRIAGE NOTES
Patient to the ER with complaints of upper abdominal pain with fever.  Patient was seen 2 days ago at [facility] and  with Biliary colic and discharged home with follow up.   He returns to the ER today because he is feeling worse.   He was given 2 tylenol at home for a 104 degree fever at 1900 this evening.     Alert and oriented x4 and ambulatory with a steady gait at time of triage.     Patient notably SOB after walking from lobby to room 50. spO2 is 91% on room air.  Patient has been placed on 2 L of oxygen at this time.   He denies any previous problems with low spO2 levels.

## 2025-07-08 NOTE — CARE COORDINATION
SW attempted to meet with patient, however, MD was in the room. NIKA will try again later if time permits.     Respectfully submitted,    Carol Ann DE LEON, ROHITS  [facility]   [phone]     Electronically signed by HALEY Kohler, LSW on 7/8/2025 at 5:39 PM

## 2025-07-08 NOTE — ED NOTES
ED TO INPATIENT SBAR HANDOFF    Patient Name: Jeanette Sanchez   Preferred Name: JEANETTE  : 1955  69 y.o.   Family/Caregiver Present: no   Code Status Order: Full Code  PO Status: NPO:Yes, at midnight   Telemetry Order: Yes  C-SSRS: Risk of Suicide: No Risk  Sitter no none  Restraints:     Sepsis Risk Score      Situation  No chief complaint on file.    Brief Description of Patient's Condition: acute cholecystitis, sepsis workup, got 2L NS and antibiotics   Mental Status: oriented, alert, coherent, logical, thought processes intact, and able to concentrate and follow conversation  Arrived from:Home  Imaging:   US GALLBLADDER RUQ   Final Result   Study is limited.  No visualized gallstones.      RECOMMENDATIONS:         XR CHEST PORTABLE   Final Result      Lungs are clear         MRI ABDOMEN W WO CONTRAST MRCP    (Results Pending)     Abnormal labs:   Abnormal Labs Reviewed   CBC WITH AUTO DIFFERENTIAL - Abnormal; Notable for the following components:       Result Value    WBC 3.8 (*)     Platelets 127 (*)     Lymphocytes Absolute 0.1 (*)     All other components within normal limits   BASIC METABOLIC PANEL W/ REFLEX TO MG FOR LOW K - Abnormal; Notable for the following components:    Glucose 118 (*)     All other components within normal limits   HEPATIC FUNCTION PANEL - Abnormal; Notable for the following components:     (*)      (*)     Total Bilirubin 3.4 (*)     Bilirubin, Direct 2.5 (*)     All other components within normal limits       Background  Allergies: No Known Allergies  History:   Past Medical History:   Diagnosis Date    External thrombosed hemorrhoids     Hypertension     Sleep apnea 2014    on c-pap       Assessment  Vitals: MEWS Score: 4  Level of Consciousness: Alert (0)   Vitals:    25 2106 25 2130 25 2245   BP: (!) 145/78  94/83   Pulse: (!) 109  91   Resp: 20  23   Temp: (!) 103.1 °F (39.5 °C)  (!) 101 °F (38.3 °C)   TempSrc: Oral  Axillary   SpO2: 90%

## 2025-07-08 NOTE — PROGRESS NOTES
4 Eyes Skin Assessment     NAME:  Andrews Sanchez  YOB: 1955  MEDICAL RECORD NUMBER:  [id number]    The patient is being assessed for  Admission    I agree that at least one RN has performed a thorough Head to Toe Skin Assessment on the patient. ALL assessment sites listed below have been assessed.      Areas assessed by both nurses:    Head, Face, Ears, Shoulders, Back, Chest, Arms, Elbows, Hands, Sacrum. Buttock, Coccyx, Ischium, Legs. Feet and Heels, and Under Medical Devices         Does the Patient have a Wound? No noted wound(s)       Gaurav Prevention initiated by RN: No  Wound Care Orders initiated by RN: No    Pressure Injury (Stage 1,2,3,4, Unstageable, DTI, NWPT, and Complex wounds) if present, place Wound referral order by RN under : No    New Ostomies, if present place, Ostomy referral order under : No     Nurse 1 eSignature: Electronically signed by Blue Ponce RN on 7/8/25 at 1:04 AM EDT    **SHARE this note so that the co-signing nurse can place an eSignature**    Nurse 2 eSignature: Electronically signed by Saul Baldwin RN on 7/8/25 at 2:21 AM EDT

## 2025-07-08 NOTE — CONSULTS
Surgery Consult Note     Addy Zimmerman PA-C  Pt Name: Andrews Sanchez  MRN: [id number]  YOB: 1955  Date of evaluation: 7/8/2025  Primary Care Physician: Jeff Oneil MD  Referred By: Yoshi Gimenez  Reason for Consultation: Acute cholecystitis  Chief Complaint:Abdominal pain  IMPRESSIONS:   Abdominal pain  Elevated LFT's; T.Bili: 3.4 -->3.7  WBC count 3.8 --> 8.7  PLANS:   MRCP. Will review once available  GI following  NPO  IVF  IV antibiotics  OOB as tolerated  Depending on MRCP may need ERCP vs surgical intervention vs both. Will continue to closely monitor and give further recommendations as needed.   SUBJECTIVE:   History of Chief Complaint:    Andrews Sanchez is a 69 y.o. male who presents with abdominal pain. He stated that the pain is located in his RUQ and that the pain began on Saturday,7/5/25. The pain developed after eating a sandwich he had left over in the fridge and thought it was food poisoning. The pain continued and he cam e to the ER. He was found to have elevated LFTs and his T. Bili continues to increase. He had an ultrasound done yesterday and that showed no visualized gallstones. An MRCP was ordered to further evaluate. He denies having any other pain at this time.   Past Medical History  Reviewed  has a past medical history of External thrombosed hemorrhoids, Hypertension, and Sleep apnea.  Past Surgical History  Reviewed has a past surgical history that includes Colonoscopy; joint replacement; eye surgery (Bilateral, 2018); and Colonoscopy (N/A, 9/27/2021).  Medications  Prior to Admission medications    Medication Sig Start Date End Date Taking? Authorizing Provider   dicyclomine (BENTYL) 10 MG capsule Take 1 capsule by mouth 3 times daily as needed (Cramping)   Yes ProviderSoto MD   ondansetron (ZOFRAN) 4 MG tablet Take 1 tablet by mouth every 8 hours as needed for Nausea or Vomiting   Yes Provider, MD Soto   oxyCODONE (ROXICODONE) 5 MG immediate release

## 2025-07-08 NOTE — PROGRESS NOTES
V2.0  [facility] [place]      Name:  Andrews Sanchez /Age/Sex: 1955  (69 y.o. male)   MRN & CSN:  [id number] & [id number] Encounter Date/Time: 2025 9:26 AM EDT    Location:  [id number] PCP: Jeff Oneil MD       Hospital Day: 2    Assessment and Plan:   Andrews Sanchez is a 69 y.o. male       Plan:  Sepsis of unclear etiology  - Suspect liver or gall bladder source due to liver enzymes  - Extremely high fevers  - Fever management with Tylenol  - Antibiotics with Zosyn.  Monitor toxicity of Zosyn with BMP for GALE  - Considering ID consult  Elevated liver labs  - Patient with newly elevated transaminases and direct bilirubin; notably normal alk phos.  Appears to be hepatocellular pattern  - Acute hepatitis panel ordered  - GI consulted.  Note  reviewed: US negative but study limited.  Concerned about cholecystitis, choledocholithiasis, and ascending cholangitis.  Recommend fluids, n.p.o. and supportive management.  MRCP pending  -Discussed with general surgery : Given the rising bilirubin level, may be due to choledocholithiasis, though no gallstones identified.  MRCP was pushed until tomorrow.  Will get repeat CT abdomen to monitor for changes with the rising labs  - MRCP unable to be scheduled today.  Confirmed with both GI and general surgery that patient is okay for clear liquids.  N.p.o. at midnight  E. coli bacteremia  - Blood culture positive for E. Coli, sensitivities pending.  GI versus urinary source  - Continue Zosyn.    Ppx: Lovenox  Dispo: Home    Subjective:     Chief Complaint: No chief complaint on file.     Interval history  Patient primarily complaining of hunger.  Still having right upper quadrant pain but mostly with palpation.  Not overly bothering him at rest.  Patient denies nausea/vomiting, diarrhea, jaundice.  Has noted darkening of his urine    Review of Systems:    Review of Systems    10 point ROS negative except as stated above in \"subjective\"

## 2025-07-08 NOTE — CONSULTS
GASTROENTEROLOGY INPATIENT CONSULTATION        IDENTIFYING DATA/REASON FOR CONSULTATION   PATIENT:  Andrews Sanchez  MRN:  [id number]  ADMIT DATE: 7/7/2025  TIME OF EVALUATION: 7/8/2025 8:18 AM  HOSPITAL STAY:   LOS: 1 day     REASON FOR CONSULTATION: Acute cholecystitis    HISTORY OF PRESENT ILLNESS   Andrews Sanchez is a 69 y.o. male with a PMH of hypertension who presented on 7/7/2025 with RUQ pain. We have been consulted regarding  Acute cholecystitis.  Patient reports on Friday had a new onset of right upper quadrant abdominal pain and 103 fever.  Endorses nausea.  Denies vomiting hematemesis hematochezia melena constipation diarrhea.  Denies prior episodes.  Patient reports he feels little bit better today.      Prior Endoscopic Evaluations:    Don Garcia MD 9/27/2021   Impression:  Normal colon     PAST MEDICAL, SURGICAL, FAMILY, and SOCIAL HISTORY     Past Medical History:   Diagnosis Date    External thrombosed hemorrhoids 2009    Hypertension     Sleep apnea 2014    on c-pap     Past Surgical History:   Procedure Laterality Date    COLONOSCOPY      COLONOSCOPY N/A 9/27/2021    COLORECTAL CANCER SCREENING, NOT HIGH RISK performed by Don Garcia MD at [facility] ENDOSCOPY    EYE SURGERY Bilateral 2018    JOINT REPLACEMENT       Family History   Problem Relation Age of Onset    Other Mother         liver failure    Heart Disease Father     High Blood Pressure Father     Cancer Father         leukemia     Social History     Socioeconomic History    Marital status:    Tobacco Use    Smoking status: Never    Smokeless tobacco: Never   Vaping Use    Vaping status: Never Used   Substance and Sexual Activity    Alcohol use: Yes    Drug use: Never    Sexual activity: Yes     Partners: Female     Social Drivers of Health     Food Insecurity: No Food Insecurity (7/8/2025)    Hunger Vital Sign     Worried About Running Out of Food in the Last Year: Never true     Ran Out of Food in the Last Year: Never true  care    If you have any questions or need any further information, please feel free to contact our consult team.  Thank you for allowing us to participate in the care of Andrews Sanchez.    The note was completed using Dragon voice recognition transcription. Every effort was made to ensure accuracy; however, inadvertent transcription errors may be present despite my best efforts to edit errors.      Rowena Knox PA-C 7/8/2025 at 8:18 AM

## 2025-07-08 NOTE — CONSULTS
Clinical Pharmacy Note  Vancomycin Consult    Pharmacy consult received for one-time dose of vancomycin in the Emergency Department per Dr. Rizo.    Ht Readings from Last 1 Encounters:   07/07/25 1.829 m (6')        Wt Readings from Last 1 Encounters:   07/07/25 119.7 kg (263 lb 14.3 oz)         Assessment/Plan:  Vancomycin 2000mg x 1 in ED.  If vancomycin is to continue on admission and pharmacy is to manage dosing, please re-consult with admission orders.    Lisbet Silva, DaianaD

## 2025-07-08 NOTE — ED PROVIDER NOTES
[facility] EMERGENCY DEPARTMENT  EMERGENCY DEPARTMENT ENCOUNTER        Pt Name: Andrews Sanchez  MRN: [id number]  Birthdate 1955  Date of evaluation: 7/7/2025  Provider: SUNDEEP Sanabria  PCP: Jeff Oneil MD  Note Started: 9:18 PM EDT 7/7/25      BOBBI. I have evaluated this patient.        CHIEF COMPLAINT       No chief complaint on file.      HISTORY OF PRESENT ILLNESS: 1 or more Elements     History From: patient    Limitations to history : None    Social Determinants Significantly Affecting Health : None    Chief Complaint:RUQ abdominal pain, fever    Andrews Sanchez is a 69 y.o. male who presents to the emergency room due to right upper quadrant abdominal pain and fever has been ongoing since this past Friday.  Patient states that his fever has been intermittent.  He states that he has been to an ER this past Saturday and was discharged 3 had lab work and was followed up with his primary care doctor today.  He was post to have outpatient ultrasound done tomorrow but he developed 103 fever around 6 PM today shortly after he drank a protein drink.  He also has some mild to moderate right upper quadrant abdominal pain.  Denies any abdominal surgeries.  Denies any chest pain or shortness of breath.  When he walked into the emergency department and vital signs were taken noted that his oxygen saturation was low and was placed on 2 L nasal cannula.  Patient did take Tylenol an hour or so prior to arrival.    Nursing Notes were all reviewed and agreed with or any disagreements were addressed in the HPI.    REVIEW OF SYSTEMS :      Review of Systems   Constitutional:  Positive for chills and fever.   Gastrointestinal:  Positive for abdominal pain. Negative for nausea and vomiting.       Positives and Pertinent negatives as per HPI.     SURGICAL HISTORY     Past Surgical History:   Procedure Laterality Date    COLONOSCOPY      COLONOSCOPY N/A 9/27/2021    COLORECTAL CANCER SCREENING, NOT HIGH  mild distress.  Normal bowel sounds in all 4 quadrants.  His lungs are clear and heart has regular rate and rhythm.  Rating his pain a 7 out of 10 right upper quadrant    Differential diagnosis includes cholelithiasis, choledocholithiasis, pancreatitis, bowel obstruction, diverticulitis, mesenteric ischemia, kidney stone    Laboratory testing was concerning with elevated total bilirubin 3.4 with an AST of 182 and ALT of 462.  Lactic acid was normal at 1.4.  CBC with a white count of 3.8.  BMP with glucose 118 otherwise unremarkable.  Patient was started on IV fluids and blood cultures were obtained.  Patient was also started on broad-spectrum IV antibiotics.    He was noted to have some shortness of breath on arrival chest x-ray was ordered showing clear lungs.    Right upper quadrant ultrasound was ordered:   FINDINGS:  LIVER:  Loss of the periportal echogenicity.  Liver is partially obscured but  no definite abnormality.  Liver is enlarged at 21.4 cm.     BILIARY SYSTEM: Gallbladder is unremarkable without evidence of  pericholecystic fluid, wall thickening or stones. Negative sonographic  Wolfe's sign.  Common bile duct is within normal limits measuring 7 mm.     RIGHT KIDNEY: The right kidney is grossly unremarkable without evidence of  hydronephrosis. 11.7 cm     PANCREAS:  Pancreas is incompletely evaluated.     OTHER: No evidence of right upper quadrant ascites.     IMPRESSION:  Study is limited.  No visualized gallstones.    Consult was placed to general surgery spoke with Dr. Herrera from general surgery about patient's presentation he will follow with the patient order MRCP for further evaluation given his right upper quadrant pain fever transaminitis    Consult also placed to gastroenterology Dr. Espinal he will follow with the patient was admitted as well plan for ERCP if necessary    I did not reorder CT scan of his abdomen pelvis given that he recently had CT scan imaging done 7/5/2025 at [facility]

## 2025-07-09 ENCOUNTER — APPOINTMENT (OUTPATIENT)
Dept: MRI IMAGING | Age: 70
End: 2025-07-09
Payer: MEDICARE

## 2025-07-09 LAB
ALBUMIN SERPL-MCNC: 3 G/DL (ref 3.4–5)
ALP SERPL-CCNC: 88 U/L (ref 40–129)
ALT SERPL-CCNC: 214 U/L (ref 10–40)
ANION GAP SERPL CALCULATED.3IONS-SCNC: 12 MMOL/L (ref 3–16)
AST SERPL-CCNC: 56 U/L (ref 15–37)
BILIRUB DIRECT SERPL-MCNC: 1 MG/DL (ref 0–0.3)
BILIRUB INDIRECT SERPL-MCNC: 0.6 MG/DL (ref 0–1)
BILIRUB SERPL-MCNC: 1.6 MG/DL (ref 0–1)
BUN SERPL-MCNC: 12 MG/DL (ref 7–20)
CALCIUM SERPL-MCNC: 7.9 MG/DL (ref 8.3–10.6)
CHLORIDE SERPL-SCNC: 107 MMOL/L (ref 99–110)
CO2 SERPL-SCNC: 20 MMOL/L (ref 21–32)
CREAT SERPL-MCNC: 0.7 MG/DL (ref 0.8–1.3)
DEPRECATED RDW RBC AUTO: 14.2 % (ref 12.4–15.4)
EST. AVERAGE GLUCOSE BLD GHB EST-MCNC: 114 MG/DL
GFR SERPLBLD CREATININE-BSD FMLA CKD-EPI: >90 ML/MIN/{1.73_M2}
GLUCOSE BLD-MCNC: 104 MG/DL (ref 70–99)
GLUCOSE BLD-MCNC: 104 MG/DL (ref 70–99)
GLUCOSE BLD-MCNC: 105 MG/DL (ref 70–99)
GLUCOSE BLD-MCNC: 115 MG/DL (ref 70–99)
GLUCOSE BLD-MCNC: 160 MG/DL (ref 70–99)
GLUCOSE SERPL-MCNC: 107 MG/DL (ref 70–99)
HBA1C MFR BLD: 5.6 %
HCT VFR BLD AUTO: 35.7 % (ref 40.5–52.5)
HGB BLD-MCNC: 12.3 G/DL (ref 13.5–17.5)
MCH RBC QN AUTO: 31.6 PG (ref 26–34)
MCHC RBC AUTO-ENTMCNC: 34.4 G/DL (ref 31–36)
MCV RBC AUTO: 92 FL (ref 80–100)
PERFORMED ON: ABNORMAL
PHOSPHATE SERPL-MCNC: 1.8 MG/DL (ref 2.5–4.9)
PLATELET # BLD AUTO: 107 K/UL (ref 135–450)
PMV BLD AUTO: 8.7 FL (ref 5–10.5)
POTASSIUM SERPL-SCNC: 3.5 MMOL/L (ref 3.5–5.1)
PROT SERPL-MCNC: 5.6 G/DL (ref 6.4–8.2)
RBC # BLD AUTO: 3.88 M/UL (ref 4.2–5.9)
SODIUM SERPL-SCNC: 139 MMOL/L (ref 136–145)
WBC # BLD AUTO: 6.6 K/UL (ref 4–11)

## 2025-07-09 PROCEDURE — 2580000003 HC RX 258: Performed by: HOSPITALIST

## 2025-07-09 PROCEDURE — 85027 COMPLETE CBC AUTOMATED: CPT

## 2025-07-09 PROCEDURE — 6360000004 HC RX CONTRAST MEDICATION: Performed by: STUDENT IN AN ORGANIZED HEALTH CARE EDUCATION/TRAINING PROGRAM

## 2025-07-09 PROCEDURE — 99232 SBSQ HOSP IP/OBS MODERATE 35: CPT | Performed by: STUDENT IN AN ORGANIZED HEALTH CARE EDUCATION/TRAINING PROGRAM

## 2025-07-09 PROCEDURE — 2500000003 HC RX 250 WO HCPCS: Performed by: INTERNAL MEDICINE

## 2025-07-09 PROCEDURE — 80076 HEPATIC FUNCTION PANEL: CPT

## 2025-07-09 PROCEDURE — 74183 MRI ABD W/O CNTR FLWD CNTR: CPT

## 2025-07-09 PROCEDURE — 1200000000 HC SEMI PRIVATE

## 2025-07-09 PROCEDURE — A9579 GAD-BASE MR CONTRAST NOS,1ML: HCPCS | Performed by: STUDENT IN AN ORGANIZED HEALTH CARE EDUCATION/TRAINING PROGRAM

## 2025-07-09 PROCEDURE — 6370000000 HC RX 637 (ALT 250 FOR IP): Performed by: HOSPITALIST

## 2025-07-09 PROCEDURE — 94760 N-INVAS EAR/PLS OXIMETRY 1: CPT

## 2025-07-09 PROCEDURE — 36415 COLL VENOUS BLD VENIPUNCTURE: CPT

## 2025-07-09 PROCEDURE — 83036 HEMOGLOBIN GLYCOSYLATED A1C: CPT

## 2025-07-09 PROCEDURE — 6360000002 HC RX W HCPCS: Performed by: HOSPITALIST

## 2025-07-09 PROCEDURE — 6360000002 HC RX W HCPCS: Performed by: INTERNAL MEDICINE

## 2025-07-09 PROCEDURE — 80069 RENAL FUNCTION PANEL: CPT

## 2025-07-09 RX ORDER — PANTOPRAZOLE SODIUM 40 MG/1
40 TABLET, DELAYED RELEASE ORAL
Status: DISCONTINUED | OUTPATIENT
Start: 2025-07-10 | End: 2025-07-09

## 2025-07-09 RX ORDER — GADOTERIDOL 279.3 MG/ML
20 INJECTION INTRAVENOUS
Status: COMPLETED | OUTPATIENT
Start: 2025-07-09 | End: 2025-07-09

## 2025-07-09 RX ADMIN — GADOTERIDOL 20 ML: 279.3 INJECTION, SOLUTION INTRAVENOUS at 10:07

## 2025-07-09 RX ADMIN — PIPERACILLIN AND TAZOBACTAM 3375 MG: 3; .375 INJECTION, POWDER, LYOPHILIZED, FOR SOLUTION INTRAVENOUS at 10:13

## 2025-07-09 RX ADMIN — PIPERACILLIN AND TAZOBACTAM 3375 MG: 3; .375 INJECTION, POWDER, LYOPHILIZED, FOR SOLUTION INTRAVENOUS at 02:33

## 2025-07-09 RX ADMIN — LOSARTAN POTASSIUM 50 MG: 50 TABLET, FILM COATED ORAL at 10:12

## 2025-07-09 RX ADMIN — ACETAMINOPHEN 650 MG: 325 TABLET ORAL at 10:16

## 2025-07-09 RX ADMIN — SODIUM CHLORIDE, PRESERVATIVE FREE 40 MG: 5 INJECTION INTRAVENOUS at 15:58

## 2025-07-09 RX ADMIN — PIPERACILLIN AND TAZOBACTAM 3375 MG: 3; .375 INJECTION, POWDER, LYOPHILIZED, FOR SOLUTION INTRAVENOUS at 17:56

## 2025-07-09 ASSESSMENT — PAIN SCALES - GENERAL
PAINLEVEL_OUTOF10: 3
PAINLEVEL_OUTOF10: 0

## 2025-07-09 ASSESSMENT — PAIN DESCRIPTION - LOCATION: LOCATION: HEAD

## 2025-07-09 ASSESSMENT — PAIN DESCRIPTION - ORIENTATION: ORIENTATION: MID

## 2025-07-09 ASSESSMENT — PAIN DESCRIPTION - DESCRIPTORS: DESCRIPTORS: ACHING

## 2025-07-09 ASSESSMENT — PAIN - FUNCTIONAL ASSESSMENT: PAIN_FUNCTIONAL_ASSESSMENT: ACTIVITIES ARE NOT PREVENTED

## 2025-07-09 NOTE — PROGRESS NOTES
This RN called lab to infer about morning blood work being drawn, states that morning labs and A1C was drawn at 0617 this morning - extra tube was drawn and will be ran for the additional labs placed this AM.    Morning Zosyn was given late d/t patient being down for MRI. Pharmacy notified to adjust future dose times.    Electronically signed by Kristopher Cunningham RN on 7/9/2025 at 10:32 AM    Call placed back to lab regarding morning lab still \"In process\" - Shayy from lab states she will check on this.    Electronically signed by Kristopher Cunningham RN on 7/9/2025 at 12:18 PM

## 2025-07-09 NOTE — CARE COORDINATION
Case Management Assessment  Initial Evaluation    Date/Time of Evaluation: 7/9/2025 3:43 PM  Assessment Completed by: HALEY Kohler, KEYANAW    If patient is discharged prior to next notation, then this note serves as note for discharge by case management.    Patient Name: Andrews Sanchez                   YOB: 1955  Diagnosis: Acute cholecystitis [K81.0]  Abdominal pain, right upper quadrant [R10.11]  Septicemia (HCC) [A41.9]  Transaminitis [R74.01]  Total bilirubin, elevated [R17]                   Date / Time: 7/7/2025  9:03 PM    Patient Admission Status: Inpatient   Readmission Risk (Low < 19, Mod (19-27), High > 27): Readmission Risk Score: 11    Current PCP: Jeff Oneil MD  PCP verified by CM? Yes    Chart Reviewed: Yes      History Provided by: Patient  Patient Orientation: Alert and Oriented    Patient Cognition: Alert    Hospitalization in the last 30 days (Readmission):  No    If yes, Readmission Assessment in  Navigator will be completed.    Advance Directives:      Code Status: Full Code   Patient's Primary Decision Maker is: Legal Next of Kin    Primary Decision Maker: Andreia Sanchez - Spouse - [phone]    Discharge Planning:    Patient lives with: Spouse/Significant Other, Other (Comment) (pets, has two dogs.) Type of Home: House  Primary Care Giver: Self  Patient Support Systems include: Spouse/Significant Other, Family Members, Other (Comment) (pets, has two dogs.)   Current Financial resources: Medicare, Other (Comment) (medicare supplement plan.)  Current community resources: None  Current services prior to admission: Durable Medical Equipment, C-pap            Current DME: Other (Comment), Cpap (inhaler.)            Type of Home Care services:  None    ADLS  Prior functional level: Independent in ADLs/IADLs  Current functional level: Independent in ADLs/IADLs    PT AM-PAC:   /24  OT AM-PAC:   /24    Family can provide assistance at DC: Yes  Would you like Case

## 2025-07-09 NOTE — PLAN OF CARE
Problem: Discharge Planning  Goal: Discharge to home or other facility with appropriate resources  7/9/2025 0846 by Kristopher Cunningham RN  Outcome: Progressing     Problem: Pain  Goal: Verbalizes/displays adequate comfort level or baseline comfort level  7/9/2025 0846 by Kristopher Cunningham RN  Outcome: Progressing     Problem: Gastrointestinal - Adult  Goal: Minimal or absence of nausea and vomiting  7/9/2025 0846 by Kristopher Cunningham RN  Outcome: Progressing     Problem: Gastrointestinal - Adult  Goal: Maintains or returns to baseline bowel function  7/9/2025 0846 by Kristopher Cunningham RN  Outcome: Progressing     Problem: Gastrointestinal - Adult  Goal: Maintains adequate nutritional intake  7/9/2025 0846 by Kristopher Cunningham RN  Outcome: Progressing

## 2025-07-09 NOTE — PROGRESS NOTES
V2.0  [facility] [place]      Name:  Andrews Sanchez /Age/Sex: 1955  (69 y.o. male)   MRN & CSN:  [id number] & [id number] Encounter Date/Time: 2025 9:26 AM EDT    Location:  [id number] PCP: Jeff Oneil MD       Hospital Day: 3    Assessment and Plan:   Andrews Sanchez is a 69 y.o. male       Plan:  Sepsis 2/2 E. Coli bacteremia  Suspected ascending cholangitis vs. cholecystitis  - Fever not spiking as high overnight only up to 100  - Fever management with Tylenol  - Continue antibiotics with Zosyn.  Monitor toxicity of Zosyn with BMP for GALE  - Blood culture x 2 positive for E. coli, sensitivities pending  Elevated liver labs - improving  - T. bili significantly improved today, near normal; AST ALT also notably improved  - Acute hepatitis panel negative  -Repeat CT abdomen pelvis  shows inflammation of the hepatic hilum, gallbladder, and second portion of the duodenum  -MRCP  with nonspecific thickening of the second portion of the duodenum, small amount of pericholecystic fluid suggestive of inflammation.  No ductal dilation  - GI consulted.  Note  reviewed: Plan for EGD to evaluate duodenum  - General Surgery note  reviewed: No imaging modality demonstrating cholelithiasis, decreases likelihood of calculus cholecystitis.  Significant improvement in labs.  No plans for acute surgical intervention, unclear how cholecystectomy would benefit the patient  - Overall picture would be consistent with an ascending cholangitis.  Difficult to completely rule out gallstone etiology, although 3 different imaging modalities failed to demonstrate cholelithiasis so feel this is lower likelihood    Ppx: Lovenox  Dispo: Home    Subjective:     Chief Complaint: No chief complaint on file.     Interval history  Patient reports some elevated temperatures last night, but not as high fevers compared to previously.  Some pain in the right side but overall improving slightly.  Denies     Color, UA ORANGE (A) Straw/Yellow    Clarity, UA Clear Clear    Glucose, Ur 100 (A) Negative mg/dL    Bilirubin, Urine LARGE (A) Negative    Ketones, Urine Negative Negative mg/dL    Specific Gravity, UA 1.025 1.005 - 1.030    Blood, Urine SMALL (A) Negative    pH, Urine 6.0 5.0 - 8.0    Protein,  (A) Negative mg/dL    Urobilinogen, Urine 4.0 (A) <2.0 E.U./dL    Nitrite, Urine Negative Negative    Leukocyte Esterase, Urine Negative Negative    Microscopic Examination YES     Urine Type Cleancatch     Hyaline Casts, UA 0-2 0 - 2 /LPF    Mucus, UA 1+ (A) None Seen /LPF    WBC, UA 3-5 0 - 5 /HPF    RBC, UA 0-2 0 - 4 /HPF    Epithelial Cells, UA 0-1 0 - 5 /HPF    Bacteria, UA 2+ (A) None Seen /HPF    Amorphous, UA 2+ /HPF   Hepatitis Panel, Acute    Collection Time: 07/08/25 11:51 AM   Result Value Ref Range    Hep A IgM Non-reactive Non-reactive    Hep B Core Ab, IgM Non-reactive Non-reactive    Hep B S Ag Interp Non-reactive Non-reactive    Hep C Ab Interp Non-reactive Non-reactive   POCT Glucose    Collection Time: 07/08/25 12:54 PM   Result Value Ref Range    POC Glucose 126 (H) 70 - 99 mg/dl    Performed on ACCU-CHEK    POCT Glucose    Collection Time: 07/09/25 12:45 AM   Result Value Ref Range    POC Glucose 104 (H) 70 - 99 mg/dl    Performed on ACCU-CHEK    Hemoglobin A1c    Collection Time: 07/09/25  6:18 AM   Result Value Ref Range    Hemoglobin A1C 5.6 See comment %    Estimated Avg Glucose 114.0 mg/dL        Imaging/Diagnostics Last 24 Hours   US GALLBLADDER RUQ  Result Date: 7/7/2025  EXAMINATION: RIGHT UPPER QUADRANT ULTRASOUND 7/7/2025 9:26 pm COMPARISON: None. HISTORY: ORDERING SYSTEM PROVIDED HISTORY: RUQ abdominal pain, fever chills TECHNOLOGIST PROVIDED HISTORY: Reason for exam:->RUQ abdominal pain, fever chills FINDINGS: LIVER:  Loss of the periportal echogenicity.  Liver is partially obscured but no definite abnormality.  Liver is enlarged at 21.4 cm. BILIARY SYSTEM: Gallbladder is

## 2025-07-09 NOTE — CARE COORDINATION
SW reached out to [employer] so they can run his IVAB benefits. SW provided basic demographic info so they can review his insurance. They are now following along.    Respectfully submitted,    Carol Ann DE LEON, MEHDI  [facility]   [phone]    Electronically signed by HALEY Kohler, KEYANAW on 7/9/2025 at 3:20 PM

## 2025-07-09 NOTE — ACP (ADVANCE CARE PLANNING)
Advance Care Planning   Healthcare Decision Maker:    Primary Decision Maker: Andreia Sanchez - Spouse - [phone]    Respectfully submitted,    Carol Ann DE LEON, ROHITS  [facility]  [phone]    Electronically signed by HALEY Kohler, KEYANAW on 7/9/2025 at 3:40 PM     rolling walker

## 2025-07-09 NOTE — PROGRESS NOTES
Patient is down for MRI at this time.    Electronically signed by Kristopher Cunningham RN on 7/9/2025 at 8:56 AM

## 2025-07-09 NOTE — PROGRESS NOTES
GENERAL SURGERY  Progress Note    Patient Name: Andrews Sanchez  MRN: [id number]  YOB: 1955   Date of Evaluation: 7/9/2025  Primary Care Physician: Jeff Oneil MD      Acute cholecystitis [K81.0]  Abdominal pain, right upper quadrant [R10.11]  Septicemia (HCC) [A41.9]  Transaminitis [R74.01]  Total bilirubin, elevated [R17]    SUBJECTIVE:     Andrews today.  His pain is improving.  He was tolerating liquids.  He denies nausea or vomiting.      REVIEW OF SYSTEMS  A comprehensive review of systems was completed and is negative except for any elements noted above.    OBJECTIVE:     BP (!) 142/81   Pulse 64   Temp 98.6 °F (37 °C) (Oral)   Resp 18   Ht 1.829 m (6')   Wt 121.4 kg (267 lb 10.2 oz)   SpO2 95%   BMI 36.30 kg/m²     PHYSICAL EXAM  General/appearance: Awake and alert, in no acute distress  Lungs: Respirations unlabored  Cardiac: Regular rate  Abdomen: soft, non-distended, tender to palpation: RUQ and epigastrium; mild  Hernia: None  Incision: None  Extremities: Warm and well perfused, no edema  Neuro: No focal findings  Skin: No rashes or wounds    LABS:     Recent Labs     07/07/25  2119 07/08/25  0005 07/08/25  0717 07/08/25  1022 07/09/25  0618   WBC 3.8*  --  8.7  --  6.6   HGB 14.7  --  12.5*  --  12.3*   HCT 42.9  --  37.4*  --  35.7*   *  --  107*  --  107*     --  138  --  139   K 3.7  --  3.9  --  3.5     --  105  --  107   CO2 21  --  21  --  20*   BUN 16  --  15  --  12   CREATININE 1.1  --  1.2  --  0.7*   PHOS  --   --   --   --  1.8*   CALCIUM 9.2  --  7.9*  --  7.9*   INR  --   --  1.35*  --   --    *  --  107*  --  56*   *  --  316*  --  214*   BILITOT 3.4*  --  3.7*  --  1.6*   BILIDIR 2.5*  --   --   --  1.0*   NITRU  --   --   --  Negative  --    COLORU  --   --   --  ORANGE*  --    BACTERIA  --   --   --  2+*  --    LACTSEPSIS 1.4 1.3  --   --   --      Recent Labs     07/09/25  0618   ALKPHOS 88   *   AST 56*   BILITOT

## 2025-07-09 NOTE — PROGRESS NOTES
INPATIENT PROGRESS NOTE        IDENTIFYING DATA/REASON FOR CONSULTATION   PATIENT:  Andrews Sanchez  MRN:  [id number]  ADMIT DATE: 2025  TIME OF EVALUATION: 2025 8:40 AM  HOSPITAL STAY:   LOS: 2 days   CONSULTING PHYSICIAN: Cyril Mccord MD   REASON FOR CONSULTATION: elevated LFTs    Subjective:    Patient seen in follow up  No new complaints.  Tolerated clear liquids yesterday  [facility] scheduled for today    MEDICATIONS   SCHEDULED:  insulin lispro, 0-4 Units, 4 times per day  acetaminophen, 1,000 mg, Once  ondansetron, 4 mg, Once  aspirin, 81 mg, Daily  losartan, 50 mg, Daily  sodium chloride flush, 5-40 mL, 2 times per day  piperacillin-tazobactam, 3,375 mg, Q8H      FLUIDS/DRIPS:     dextrose      sodium chloride       PRNs: glucose, 4 tablet, PRN  dextrose bolus, 125 mL, PRN   Or  dextrose bolus, 250 mL, PRN  glucagon (rDNA), 1 mg, PRN  dextrose, , Continuous PRN  dicyclomine, 10 mg, TID PRN  sodium chloride flush, 5-40 mL, PRN  sodium chloride, , PRN  potassium chloride, 40 mEq, PRN   Or  potassium alternative oral replacement, 40 mEq, PRN   Or  potassium chloride, 10 mEq, PRN  magnesium sulfate, 2,000 mg, PRN  ondansetron, 4 mg, Q8H PRN   Or  ondansetron, 4 mg, Q6H PRN  polyethylene glycol, 17 g, Daily PRN  acetaminophen, 650 mg, Q6H PRN   Or  acetaminophen, 650 mg, Q6H PRN  HYDROmorphone, 0.25 mg, Q3H PRN   Or  HYDROmorphone, 0.5 mg, Q3H PRN      ALLERGIES:  No Known Allergies      PHYSICAL EXAM   VITALS:  /82   Pulse 70   Temp 98.5 °F (36.9 °C) (Oral)   Resp 18   Ht 1.829 m (6')   Wt 121.4 kg (267 lb 10.2 oz)   SpO2 97%   BMI 36.30 kg/m²   TEMPERATURE:  Current - Temp: 98.5 °F (36.9 °C); Max - Temp  Av.7 °F (37.1 °C)  Min: 97.2 °F (36.2 °C)  Max: 100 °F (37.8 °C)    Physical Exam:  General appearance: alert, cooperative, no distress, appears stated age  Eyes: Anicteric  Head: Normocephalic, without obvious abnormality  Lungs: clear to auscultation bilaterally, Normal Effort  Heart:

## 2025-07-10 ENCOUNTER — ANESTHESIA EVENT (OUTPATIENT)
Dept: ENDOSCOPY | Age: 70
End: 2025-07-10
Payer: MEDICARE

## 2025-07-10 ENCOUNTER — ANESTHESIA (OUTPATIENT)
Dept: ENDOSCOPY | Age: 70
End: 2025-07-10
Payer: MEDICARE

## 2025-07-10 VITALS
HEART RATE: 62 BPM | DIASTOLIC BLOOD PRESSURE: 75 MMHG | OXYGEN SATURATION: 97 % | TEMPERATURE: 97.7 F | SYSTOLIC BLOOD PRESSURE: 138 MMHG | HEIGHT: 72 IN | BODY MASS INDEX: 36.25 KG/M2 | RESPIRATION RATE: 17 BRPM | WEIGHT: 267.64 LBS

## 2025-07-10 PROBLEM — K29.80 DUODENITIS: Status: ACTIVE | Noted: 2025-07-10

## 2025-07-10 LAB
ALBUMIN SERPL-MCNC: 2.9 G/DL (ref 3.4–5)
ALP SERPL-CCNC: 71 U/L (ref 40–129)
ALT SERPL-CCNC: 140 U/L (ref 10–40)
ANION GAP SERPL CALCULATED.3IONS-SCNC: 9 MMOL/L (ref 3–16)
AST SERPL-CCNC: 30 U/L (ref 15–37)
BACTERIA BLD CULT ORG #2: ABNORMAL
BACTERIA BLD CULT ORG #2: ABNORMAL
BACTERIA BLD CULT: ABNORMAL
BASOPHILS # BLD: 0 K/UL (ref 0–0.2)
BASOPHILS NFR BLD: 0.3 %
BILIRUB DIRECT SERPL-MCNC: 0.6 MG/DL (ref 0–0.3)
BILIRUB INDIRECT SERPL-MCNC: 0.4 MG/DL (ref 0–1)
BILIRUB SERPL-MCNC: 1 MG/DL (ref 0–1)
BUN SERPL-MCNC: 13 MG/DL (ref 7–20)
CALCIUM SERPL-MCNC: 8.3 MG/DL (ref 8.3–10.6)
CHLORIDE SERPL-SCNC: 109 MMOL/L (ref 99–110)
CO2 SERPL-SCNC: 24 MMOL/L (ref 21–32)
CREAT SERPL-MCNC: 0.9 MG/DL (ref 0.8–1.3)
DEPRECATED RDW RBC AUTO: 14.2 % (ref 12.4–15.4)
EOSINOPHIL # BLD: 0.1 K/UL (ref 0–0.6)
EOSINOPHIL NFR BLD: 2.7 %
GFR SERPLBLD CREATININE-BSD FMLA CKD-EPI: >90 ML/MIN/{1.73_M2}
GLUCOSE BLD-MCNC: 100 MG/DL (ref 70–99)
GLUCOSE BLD-MCNC: 102 MG/DL (ref 70–99)
GLUCOSE SERPL-MCNC: 106 MG/DL (ref 70–99)
HCT VFR BLD AUTO: 35.5 % (ref 40.5–52.5)
HGB BLD-MCNC: 12 G/DL (ref 13.5–17.5)
LYMPHOCYTES # BLD: 0.8 K/UL (ref 1–5.1)
LYMPHOCYTES NFR BLD: 15.6 %
MCH RBC QN AUTO: 31.5 PG (ref 26–34)
MCHC RBC AUTO-ENTMCNC: 33.9 G/DL (ref 31–36)
MCV RBC AUTO: 92.9 FL (ref 80–100)
MONOCYTES # BLD: 0.4 K/UL (ref 0–1.3)
MONOCYTES NFR BLD: 8.4 %
NEUTROPHILS # BLD: 3.7 K/UL (ref 1.7–7.7)
NEUTROPHILS NFR BLD: 73 %
ORGANISM: ABNORMAL
PERFORMED ON: ABNORMAL
PERFORMED ON: ABNORMAL
PLATELET # BLD AUTO: 134 K/UL (ref 135–450)
PMV BLD AUTO: 8.1 FL (ref 5–10.5)
POTASSIUM SERPL-SCNC: 4 MMOL/L (ref 3.5–5.1)
PROT SERPL-MCNC: 5.5 G/DL (ref 6.4–8.2)
RBC # BLD AUTO: 3.82 M/UL (ref 4.2–5.9)
SODIUM SERPL-SCNC: 142 MMOL/L (ref 136–145)
WBC # BLD AUTO: 5.1 K/UL (ref 4–11)

## 2025-07-10 PROCEDURE — 36415 COLL VENOUS BLD VENIPUNCTURE: CPT

## 2025-07-10 PROCEDURE — 7100000000 HC PACU RECOVERY - FIRST 15 MIN: Performed by: INTERNAL MEDICINE

## 2025-07-10 PROCEDURE — 2580000003 HC RX 258: Performed by: HOSPITALIST

## 2025-07-10 PROCEDURE — APPSS15 APP SPLIT SHARED TIME 0-15 MINUTES: Performed by: PHYSICIAN ASSISTANT

## 2025-07-10 PROCEDURE — 6370000000 HC RX 637 (ALT 250 FOR IP): Performed by: STUDENT IN AN ORGANIZED HEALTH CARE EDUCATION/TRAINING PROGRAM

## 2025-07-10 PROCEDURE — 0DB68ZX EXCISION OF STOMACH, VIA NATURAL OR ARTIFICIAL OPENING ENDOSCOPIC, DIAGNOSTIC: ICD-10-PCS | Performed by: INTERNAL MEDICINE

## 2025-07-10 PROCEDURE — 99231 SBSQ HOSP IP/OBS SF/LOW 25: CPT | Performed by: STUDENT IN AN ORGANIZED HEALTH CARE EDUCATION/TRAINING PROGRAM

## 2025-07-10 PROCEDURE — 6360000002 HC RX W HCPCS: Performed by: HOSPITALIST

## 2025-07-10 PROCEDURE — 3700000001 HC ADD 15 MINUTES (ANESTHESIA): Performed by: INTERNAL MEDICINE

## 2025-07-10 PROCEDURE — 3609012400 HC EGD TRANSORAL BIOPSY SINGLE/MULTIPLE: Performed by: INTERNAL MEDICINE

## 2025-07-10 PROCEDURE — 6370000000 HC RX 637 (ALT 250 FOR IP): Performed by: HOSPITALIST

## 2025-07-10 PROCEDURE — 85025 COMPLETE CBC W/AUTO DIFF WBC: CPT

## 2025-07-10 PROCEDURE — 2709999900 HC NON-CHARGEABLE SUPPLY: Performed by: INTERNAL MEDICINE

## 2025-07-10 PROCEDURE — 6360000002 HC RX W HCPCS: Performed by: INTERNAL MEDICINE

## 2025-07-10 PROCEDURE — 2500000003 HC RX 250 WO HCPCS: Performed by: INTERNAL MEDICINE

## 2025-07-10 PROCEDURE — 80048 BASIC METABOLIC PNL TOTAL CA: CPT

## 2025-07-10 PROCEDURE — APPNB15 APP NON BILLABLE TIME 0-15 MINS: Performed by: PHYSICIAN ASSISTANT

## 2025-07-10 PROCEDURE — 6360000002 HC RX W HCPCS: Performed by: NURSE ANESTHETIST, CERTIFIED REGISTERED

## 2025-07-10 PROCEDURE — 7100000001 HC PACU RECOVERY - ADDTL 15 MIN: Performed by: INTERNAL MEDICINE

## 2025-07-10 PROCEDURE — 88305 TISSUE EXAM BY PATHOLOGIST: CPT

## 2025-07-10 PROCEDURE — 80076 HEPATIC FUNCTION PANEL: CPT

## 2025-07-10 PROCEDURE — 2500000003 HC RX 250 WO HCPCS: Performed by: HOSPITALIST

## 2025-07-10 PROCEDURE — 3700000000 HC ANESTHESIA ATTENDED CARE: Performed by: INTERNAL MEDICINE

## 2025-07-10 PROCEDURE — 94761 N-INVAS EAR/PLS OXIMETRY MLT: CPT

## 2025-07-10 RX ORDER — ONDANSETRON 2 MG/ML
4 INJECTION INTRAMUSCULAR; INTRAVENOUS
Status: DISCONTINUED | OUTPATIENT
Start: 2025-07-10 | End: 2025-07-10 | Stop reason: HOSPADM

## 2025-07-10 RX ORDER — PROPOFOL 10 MG/ML
INJECTION, EMULSION INTRAVENOUS
Status: DISCONTINUED | OUTPATIENT
Start: 2025-07-10 | End: 2025-07-10 | Stop reason: SDUPTHER

## 2025-07-10 RX ORDER — CIPROFLOXACIN 500 MG/1
500 TABLET, FILM COATED ORAL 2 TIMES DAILY
Qty: 23 TABLET | Refills: 0 | Status: SHIPPED | OUTPATIENT
Start: 2025-07-10 | End: 2025-07-22

## 2025-07-10 RX ORDER — L. ACIDOPHILUS/L.BULGARICUS 1MM CELL
1 TABLET ORAL DAILY
Qty: 14 TABLET | Refills: 0 | Status: SHIPPED | OUTPATIENT
Start: 2025-07-10

## 2025-07-10 RX ORDER — LIDOCAINE HYDROCHLORIDE 20 MG/ML
INJECTION, SOLUTION EPIDURAL; INFILTRATION; INTRACAUDAL; PERINEURAL
Status: DISCONTINUED | OUTPATIENT
Start: 2025-07-10 | End: 2025-07-10 | Stop reason: SDUPTHER

## 2025-07-10 RX ORDER — GLYCOPYRROLATE 0.2 MG/ML
INJECTION INTRAMUSCULAR; INTRAVENOUS
Status: DISCONTINUED | OUTPATIENT
Start: 2025-07-10 | End: 2025-07-10 | Stop reason: SDUPTHER

## 2025-07-10 RX ORDER — CETIRIZINE HYDROCHLORIDE 10 MG/1
10 TABLET ORAL DAILY
Status: DISCONTINUED | OUTPATIENT
Start: 2025-07-10 | End: 2025-07-10 | Stop reason: HOSPADM

## 2025-07-10 RX ORDER — PANTOPRAZOLE SODIUM 40 MG/1
40 TABLET, DELAYED RELEASE ORAL 2 TIMES DAILY
Status: DISCONTINUED | OUTPATIENT
Start: 2025-07-10 | End: 2025-07-10 | Stop reason: HOSPADM

## 2025-07-10 RX ADMIN — ACETAMINOPHEN 650 MG: 325 TABLET ORAL at 02:20

## 2025-07-10 RX ADMIN — PANTOPRAZOLE SODIUM 40 MG: 40 TABLET, DELAYED RELEASE ORAL at 09:11

## 2025-07-10 RX ADMIN — PROPOFOL 200 MCG/KG/MIN: 10 INJECTION, EMULSION INTRAVENOUS at 12:43

## 2025-07-10 RX ADMIN — PIPERACILLIN AND TAZOBACTAM 3375 MG: 3; .375 INJECTION, POWDER, LYOPHILIZED, FOR SOLUTION INTRAVENOUS at 02:18

## 2025-07-10 RX ADMIN — GLYCOPYRROLATE 0.1 MG: 0.2 INJECTION, SOLUTION INTRAMUSCULAR; INTRAVENOUS at 12:33

## 2025-07-10 RX ADMIN — LOSARTAN POTASSIUM 50 MG: 50 TABLET, FILM COATED ORAL at 09:11

## 2025-07-10 RX ADMIN — PROPOFOL 50 MG: 10 INJECTION, EMULSION INTRAVENOUS at 12:42

## 2025-07-10 RX ADMIN — LIDOCAINE HYDROCHLORIDE 80 MG: 20 INJECTION, SOLUTION EPIDURAL; INFILTRATION; INTRACAUDAL; PERINEURAL at 12:41

## 2025-07-10 RX ADMIN — SODIUM CHLORIDE, PRESERVATIVE FREE 40 MG: 5 INJECTION INTRAVENOUS at 02:15

## 2025-07-10 RX ADMIN — PIPERACILLIN AND TAZOBACTAM 3375 MG: 3; .375 INJECTION, POWDER, LYOPHILIZED, FOR SOLUTION INTRAVENOUS at 13:27

## 2025-07-10 RX ADMIN — SODIUM CHLORIDE: 9 INJECTION, SOLUTION INTRAVENOUS at 12:18

## 2025-07-10 RX ADMIN — PROPOFOL 100 MG: 10 INJECTION, EMULSION INTRAVENOUS at 12:41

## 2025-07-10 RX ADMIN — SODIUM CHLORIDE, PRESERVATIVE FREE 10 ML: 5 INJECTION INTRAVENOUS at 09:12

## 2025-07-10 RX ADMIN — CETIRIZINE HYDROCHLORIDE 10 MG: 10 TABLET, FILM COATED ORAL at 13:26

## 2025-07-10 ASSESSMENT — PAIN DESCRIPTION - ORIENTATION: ORIENTATION: MID

## 2025-07-10 ASSESSMENT — PAIN DESCRIPTION - PAIN TYPE: TYPE: ACUTE PAIN

## 2025-07-10 ASSESSMENT — PAIN SCALES - GENERAL
PAINLEVEL_OUTOF10: 3
PAINLEVEL_OUTOF10: 1

## 2025-07-10 ASSESSMENT — PAIN - FUNCTIONAL ASSESSMENT
PAIN_FUNCTIONAL_ASSESSMENT: ACTIVITIES ARE NOT PREVENTED
PAIN_FUNCTIONAL_ASSESSMENT: ADULT NONVERBAL PAIN SCALE (NPVS)
PAIN_FUNCTIONAL_ASSESSMENT: 0-10

## 2025-07-10 ASSESSMENT — PAIN DESCRIPTION - DESCRIPTORS: DESCRIPTORS: ACHING;HEAVINESS

## 2025-07-10 ASSESSMENT — PAIN DESCRIPTION - FREQUENCY: FREQUENCY: INTERMITTENT

## 2025-07-10 ASSESSMENT — LIFESTYLE VARIABLES: SMOKING_STATUS: 0

## 2025-07-10 ASSESSMENT — PAIN DESCRIPTION - LOCATION: LOCATION: HEAD

## 2025-07-10 ASSESSMENT — PAIN DESCRIPTION - ONSET: ONSET: GRADUAL

## 2025-07-10 NOTE — PROGRESS NOTES
General and Vascular Surgery                                                           Daily Progress Note                                                             Addy Zimmerman PA-C    Pt Name: Andrews Sanchez  Medical Record Number: [id number]  Date of Birth 1955   Today's Date: 7/10/2025    ASSESSMENT/PLAN  Hyperbilirubinemia  WBC count WNL  No evidence of cholelithiasis. No signs of cholecystitis   LFT's improving  Denies having any abdominal pain this AM  Tolerating NPO for EGD today  No general surgery plans at this time. Will continue to monitor and give further recommendations as needed.     EDUCATION  Patient educated about their illness/diagnosis, stated above, and all questions answered. We discussed the importance of nutrition, medications they are taking, and healthy lifestyle.  SUBJECTIVE  Andrews has improved from yesterday. Pain is well controlled. He has no nausea and no vomiting.  He is tolerating NPO for EGD today. Current activity is ad huyen    OBJECTIVE  VITALS:  height is 1.829 m (6') and weight is 121.4 kg (267 lb 10.2 oz). His oral temperature is 97.7 °F (36.5 °C). His blood pressure is 125/65 and his pulse is 53. His respiration is 18 and oxygen saturation is 99%. VITALS:  /65   Pulse 53   Temp 97.7 °F (36.5 °C) (Oral)   Resp 18   Ht 1.829 m (6')   Wt 121.4 kg (267 lb 10.2 oz)   SpO2 99%   BMI 36.30 kg/m²   GENERAL: alert, no distress  LUNGS: clear to ausculation, without wheezes, rales or rhonci  HEART: normal rate and regular rhythm  ABDOMEN: soft, non-tender, non-distended  EXTREMITY: no cyanosis, clubbing or edema  I/O last 3 completed shifts:  In: 1190 [P.O.:840; IV Piggyback:350]  Out: 200 [Urine:200]  No intake/output data recorded.    LABS  Recent Labs     07/08/25  0717 07/08/25  1022 07/09/25  0618 07/10/25  0736   WBC 8.7  --  6.6 5.1   HGB 12.5*  --  12.3* 12.0*   HCT 37.4*  --  35.7* 35.5*   *   --  107* 134*     --  139 142   K 3.9  --  3.5 4.0     --  107 109   CO2 21  --  20* 24   BUN 15  --  12 13   CREATININE 1.2  --  0.7* 0.9   PHOS  --   --  1.8*  --    CALCIUM 7.9*  --  7.9* 8.3   INR 1.35*  --   --   --    *  --  56* 30   *  --  214* 140*   BILITOT 3.7*  --  1.6* 1.0   BILIDIR  --   --  1.0* 0.6*   NITRU  --  Negative  --   --    COLORU  --  ORANGE*  --   --    BACTERIA  --  2+*  --   --    CBC:   Lab Results   Component Value Date/Time    WBC 5.1 07/10/2025 07:36 AM    RBC 3.82 07/10/2025 07:36 AM    HGB 12.0 07/10/2025 07:36 AM    HCT 35.5 07/10/2025 07:36 AM    MCV 92.9 07/10/2025 07:36 AM    MCH 31.5 07/10/2025 07:36 AM    MCHC 33.9 07/10/2025 07:36 AM    RDW 14.2 07/10/2025 07:36 AM     07/10/2025 07:36 AM    MPV 8.1 07/10/2025 07:36 AM     CMP:    Lab Results   Component Value Date/Time     07/10/2025 07:36 AM    K 4.0 07/10/2025 07:36 AM     07/10/2025 07:36 AM    CO2 24 07/10/2025 07:36 AM    BUN 13 07/10/2025 07:36 AM    CREATININE 0.9 07/10/2025 07:36 AM    AGRATIO 1.4 07/08/2025 07:17 AM    LABGLOM >90 07/10/2025 07:36 AM    GLUCOSE 106 07/10/2025 07:36 AM    CALCIUM 8.3 07/10/2025 07:36 AM    BILITOT 1.0 07/10/2025 07:36 AM    ALKPHOS 71 07/10/2025 07:36 AM    AST 30 07/10/2025 07:36 AM     07/10/2025 07:36 AM         Addy Zimmerman PA-C   Electronically signed 7/10/2025 at 10:05 AM

## 2025-07-10 NOTE — OP NOTE
Endoscopy Note    Patient: Andrews Sanchez  : 1955  CSN: [id number]    Procedure: Esophagogastroduodenoscopy with biopsy    Date:  7/10/2025     Surgeon:  Mirza Dominguez MD     Referring Physician:  Cristina Machado MD    Preoperative Diagnosis:  Epigastric abdominal pain [R10.13]    Postoperative Diagnosis:  * No post-op diagnosis entered *    Anesthesia:  MAC    EBL: minimal to none.    Indications: This is a 69 y.o. year old male who presents today with possible duodenitis seen on CT scan.    Description of Procedure:  Informed consent was obtained from the patient after explanation of indications, benefits and possible risks and complications of the procedure.  The patient was then taken to the endoscopy suite, placed in the left lateral decubitus position and the above IV sedation was administrered.    The Olympus video endoscope was passed through the hypopharynx into the esophagus. The scope was advanced all the way to the second portion of the duodenum. The scope was slowly withdrawn and mucosa was carefully evaluated including a retroflex view of the proximal stomach.  Findings and interventions are described below.  The patient was decompressed and the scope was then withdrawn.    Gastric or Duodenal ulcer present: No    The patient tolerated the procedure well and was taken to the post anesthesia care unit in good condition.  There were no immediate complications.      Impression:  -  1.  Normal esophagus.  2.  Minimal antritis with some patchy erythema.  Biopsies taken in the antrum and fundus of the stomach to rule out H. pylori using the Betty protocol.  3.  Normal duodenum.  No evidence of any ulceration, inflammation, edema or mass lesion.      Recommendations: -Follow-up on biopsy results.  Inflammation in the duodenal area may be coming from the gallbladder.  Continue PPI daily.  MRCP is negative for choledocholithiasis, but CT and MRI show possible inflammation of the gallbladder.   Surgery is following.  We will follow-up as needed..    Mirza Dominguez MD, [facility]  [phone]    Please note that some or all of this record was generated using voice recognition software. If there are any questions about the content of this document, please contact the author as some errors in translation may have occurred.

## 2025-07-10 NOTE — PROGRESS NOTES
PACU Transfer Note    Vitals:    07/10/25 1305   BP: 124/68   Pulse: 60   Resp: 18   Temp: 97.1 °F (36.2 °C)   SpO2: 95%       In: 400 [I.V.:400]  Out: -     Pain assessment:  none. Pt denies complaint of pain.   Pain Level: 0    Report given to Receiving unit, Ondina AVENDANO.    7/10/2025 1:09 PM

## 2025-07-10 NOTE — FLOWSHEET NOTE
07/10/25 0808   Vital Signs   Temp 97.7 °F (36.5 °C)   Temp Source Oral   Pulse 53   Heart Rate Source Monitor   Respirations 18   /65   MAP (Calculated) 85   Pain Assessment   Pain Assessment None - Denies Pain   Oxygen Therapy   SpO2 99 %   O2 Device None (Room air)   Rhythm Interpretation   Cardiac Rhythm Sinus rhythm       Lab Results   Component Value Date/Time    WBC 5.1 07/10/2025 07:36 AM    HGB 12.0 (L) 07/10/2025 07:36 AM    HCT 35.5 (L) 07/10/2025 07:36 AM     (L) 07/10/2025 07:36 AM     07/09/2025 06:18 AM    K 3.5 07/09/2025 06:18 AM    K 3.9 07/08/2025 07:17 AM    BUN 12 07/09/2025 06:18 AM    CREATININE 0.7 (L) 07/09/2025 06:18 AM        AM Assessment completed.  Patient in bed.  Awake, Alert and oriented. Respirations easy unlabored.    Pain/Discomfort is being managed with PRN analgesics per MD orders (See MAR). Patient is able to express and rate pain using numerical scale.  Plan of care, education and safety measures reviewed and mutually agreed upon with the patient.   Calls appropriately. Needed items including call light in reach and exit alarm in place.

## 2025-07-10 NOTE — ANESTHESIA PRE PROCEDURE
physical examination.  Anesthetic plan, risks, benefits, alternatives, and personnel involved discussed with patient.  Patient verbalized an understanding and agrees to proceed.      Parmjit Nicolas MD  July 10, 2025  11:34 AM

## 2025-07-10 NOTE — H&P
Gastroenterology Note             Pre-operative History and Physical    Patient: Andrews Sanchez  : 1955  CSN: [id number]    History Obtained From:  patient and/or guardian.     HISTORY OF PRESENT ILLNESS:    The patient is a 69 y.o. male  here for duodenitis on CT.      Past Medical History:    Past Medical History:   Diagnosis Date    External thrombosed hemorrhoids     Hypertension     Sleep apnea 2014    on c-pap     Past Surgical History:    Past Surgical History:   Procedure Laterality Date    COLONOSCOPY      COLONOSCOPY N/A 2021    COLORECTAL CANCER SCREENING, NOT HIGH RISK performed by Don Garcia MD at [facility] END[facility]COPY    EYE SURGERY Bilateral 2018    JOINT REPLACEMENT       Medications Prior to Admission:   No current facility-administered medications on file prior to encounter.     Current Outpatient Medications on File Prior to Encounter   Medication Sig Dispense Refill    dicyclomine (BENTYL) 10 MG capsule Take 1 capsule by mouth 3 times daily as needed (Cramping)      ondansetron (ZOFRAN) 4 MG tablet Take 1 tablet by mouth every 8 hours as needed for Nausea or Vomiting      meloxicam (MOBIC) 7.5 MG tablet Take 1 tablet by mouth daily      sildenafil (VIAGRA) 100 MG tablet Take 1 tablet by mouth as needed for Erectile Dysfunction      olmesartan (BENICAR) 20 MG tablet Take 1 tablet by mouth daily      metFORMIN (GLUCOPHAGE-XR) 500 MG extended release tablet Take 2 tablets by mouth daily (with breakfast)      Dulaglutide (TRULICITY) 4.5 MG/0.5ML SOAJ Inject 4.5 mg into the skin once a week Injects on       albuterol sulfate  (90 Base) MCG/ACT inhaler Inhale 2 puffs into the lungs every 6 hours as needed      aspirin 81 MG EC tablet Take 1 tablet by mouth daily      rosuvastatin (CRESTOR) 40 MG tablet Take 1 tablet by mouth daily          Allergies:  Patient has no known allergies.      Social History:   Social History     Tobacco Use    Smoking status: Never

## 2025-07-10 NOTE — ANESTHESIA POSTPROCEDURE EVALUATION
Department of Anesthesiology  Postprocedure Note    Patient: Andrews Sanchez  MRN: [id number]  YOB: 1955  Date of evaluation: 7/10/2025    Procedure Summary       Date: 07/10/25 Room / Location: [place] [address] / [facility]    Anesthesia Start: 1230 Anesthesia Stop: 1253    Procedure: ESOPHAGOGASTRODUODENOSCOPY BIOPSY Diagnosis:       Epigastric abdominal pain      (Epigastric abdominal pain [R10.13])    Surgeons: Mirza Dominguez MD Responsible Provider: Parmjit Nicolas MD    Anesthesia Type: MAC ASA Status: 2            Anesthesia Type: No value filed.    Emerita Phase I: Emerita Score: 10    Emerita Phase II:      Anesthesia Post Evaluation    Patient location during evaluation: PACU  Level of consciousness: awake and alert  Airway patency: patent  Nausea & Vomiting: no nausea and no vomiting  Cardiovascular status: blood pressure returned to baseline  Respiratory status: acceptable  Hydration status: euvolemic  Comments: Postoperative Anesthesia Note    Name:    Andrews Sanchez  MRN:      [id number]    Patient Vitals in the past 12 hrs:  07/10/25 1337, BP:(!) 151/88, Temp:97.7 °F (36.5 °C), Temp src:Oral, Pulse:59, Resp:18, SpO2:98 %  07/10/25 1305, BP:124/68, Temp:97.1 °F (36.2 °C), Temp src:Temporal, Pulse:60, Resp:18, SpO2:95 %  07/10/25 1300, BP:105/64, Pulse:63, Resp:18, SpO2:99 %  07/10/25 1255, BP:104/63, Pulse:60, Resp:18, SpO2:97 %  07/10/25 1251, BP:98/61, Temp:96.9 °F (36.1 °C), Temp src:Temporal, Pulse:63, Resp:17, SpO2:96 %  07/10/25 1103, BP:(!) 148/80, Temp:99 °F (37.2 °C), Temp src:Oral, Pulse:58, Resp:16, SpO2:98 %  07/10/25 0808, BP:125/65, Temp:97.7 °F (36.5 °C), Temp src:Oral, Pulse:53, Resp:18, SpO2:99 %     LABS:    CBC  Lab Results       Component                Value               Date/Time                  WBC                      5.1                 07/10/2025 07:36 AM        HGB                      12.0 (L)            07/10/2025 07:36 AM        HCT

## 2025-07-10 NOTE — PROGRESS NOTES
Patient admitted to PACU # 9 from [facility] at 1251 post EGD biopsy per Dr. Dominguez.  Attached to PACU monitoring system and report received from anesthesia provider.  Patient was reported to be hemodynamically stable during procedure.  Patient unresponsive d/t level of sedation. Pt shows no signs of pain.

## 2025-07-10 NOTE — PROGRESS NOTES
Shift assessment done. All night time medications given per MAR. Patient instructed to be NPO starting at 12 midnight for a possible procedure in AM. All fall precautions implemented. All needs attended. Electronically signed by Ruchi Interiano RN on 7/9/2025 at 9:21 PM

## 2025-07-10 NOTE — PROGRESS NOTES
Nursing Discharge Note    Verbal and Written discharge instructions was given to the patient including diet, activity and medications.      these medications from [facility] [id number] - [facility] [address] - P [phone] - F [phone]  ciprofloxacin  Lactobacillus     At the time of discharge, patient reported feeling stable. The patient expressed appropriate understanding of, and agreement with, the discharge recommendations, medications, and follow up appointments and understands to seek medical attention if they experience recurrence or worsening of symptoms.       D/C'd IV patient tolerated well, no signs of bleeding.The written instructions was given to the patient to take home. Pt discharged home with all belongings. Out via wheelchair.

## 2025-07-10 NOTE — PROGRESS NOTES
To endo via stretcher. Belongings remain in room 5209.   Consent on chart still needs to be signed. Patient wiped down with CHG wipes and new gown. Nothing under the gown.

## 2025-07-10 NOTE — CARE COORDINATION
Case Management Discharge Note          Date / Time of Note: 7/10/2025 4:35 PM                  Patient Name: Andrews Sanchez   YOB: 1955  Diagnosis: Acute cholecystitis [K81.0]  Abdominal pain, right upper quadrant [R10.11]  Septicemia (HCC) [A41.9]  Transaminitis [R74.01]  Total bilirubin, elevated [R17]   Date / Time: 7/7/2025  9:03 PM    Financial:  Payor: MEDICARE / Plan: MEDICARE PART A AND B / Product Type: *No Product type* /      Pharmacy:    [id number] - [place], [place] - [address] - P [phone] - F [phone]  [address]  [facility] [address]  Phone: [phone] Fax: [phone]      Assistance purchasing medications?: Potential Assistance Purchasing Medications: No  Assistance provided by Case Management: None at this time    DISCHARGE Disposition: Home- No Services Needed    Transportation:  Transportation PLAN for discharge: family   Mode of Transport: Private Car  Time of Transport: TBD by patient, family and/or medical staff.     Transport form completed: Yes    IMM Completed:   Yes, Case management has presented and reviewed IMM letter #2.   IMM Letter given to Patient/Family/Significant other/Guardian/POA/by:: reviewed with patient at bedside and left a copy today. SLR   IMM Letter date given:: 07/10/25  IMM Letter time given:: 1601.   Patient and/or family/POA verbalized understanding of their medicare rights and appeal process if needed. Patient and/or family/POA has signed, initialed and placed the date and time on IMM letter #2 on the the appropriate lines. Copy of letter offered and they are aware that the original copy of IMM letter #2 is available prior to discharge from the paper chart on the unit.  Electronic documentation has been entered into epic for IMM letter #2 and original paper copy has been added to the paper chart at the nurses station.     Additional CM Notes:     The Plan for Transition of Care is related to the following  treatment goals of Acute cholecystitis [K81.0]  Abdominal pain, right upper quadrant [R10.11]  Septicemia (HCC) [A41.9]  Transaminitis [R74.01]  Total bilirubin, elevated [R17]    Respectfully submitted,    Carol Ann DE LEON, MEHDI  [facility]   [phone]    Electronically signed by HALEY Kohler, LSW on 7/10/2025 at 4:36 PM

## 2025-07-10 NOTE — PROGRESS NOTES
Perfect serve message to md per patient request for zyrtec for \"sinuses\".     Consent printed and placed on hard chart. Patient unable to sign at this time because he says no on has explained to him the risk/benefits of EGD.

## 2025-07-10 NOTE — PLAN OF CARE
Problem: Pain  Goal: Verbalizes/displays adequate comfort level or baseline comfort level  7/9/2025 2120 by Ruchi Interiano RN  Outcome: Progressing  7/9/2025 0846 by Kristopher Cunningham RN  Outcome: Progressing     Problem: Gastrointestinal - Adult  Goal: Minimal or absence of nausea and vomiting  7/9/2025 2120 by Ruchi Interiano RN  Outcome: Progressing  7/9/2025 0846 by Kristopher Cunningham RN  Outcome: Progressing  Goal: Maintains or returns to baseline bowel function  7/9/2025 2120 by Ruchi Interiano RN  Outcome: Progressing  7/9/2025 0846 by Kristopher Cunningham RN  Outcome: Progressing  Goal: Maintains adequate nutritional intake  7/9/2025 2120 by Ruchi Interiano RN  Outcome: Progressing  7/9/2025 0846 by Kristopher Cunningham RN  Outcome: Progressing     Problem: Discharge Planning  Goal: Discharge to home or other facility with appropriate resources  7/9/2025 2120 by Ruchi Interiano RN  Outcome: Progressing  7/9/2025 0846 by Kristopher Cunningham RN  Outcome: Progressing      This is a \"Welcome to [employer]"  Initial Preventive Physical Examination (IPPE) providing Personalized Prevention Plan Services (Performed in the first 12 months of enrollment)    I have reviewed the patient's medical history in detail and updated the computerized patient record. Also for follow-up of his health issues. His blood pressures been under great control. Weight is fairly stable. He is tolerating his statin drugs well. Sugars have been under good control. He is rarely checking them. A complete review of systems is otherwise negative. ROS - Saw urology in the last year. Physical Examination: General appearance - alert, well appearing, and in no distress  Ears - bilateral TM's and external ear canals normal  Nose - normal and patent, no erythema, discharge or polyps  Mouth - mucous membranes moist, pharynx normal without lesions  Neck - supple, no significant adenopathy  Lymphatics - no palpable lymphadenopathy, no hepatosplenomegaly  Chest - clear to auscultation, no wheezes, rales or rhonchi, symmetric air entry  Heart - normal rate and regular rhythm  Abdomen - soft, nontender, nondistended, no masses or organomegaly. Port in the left upper quadrant.   Neurological - alert, oriented, normal speech, no focal findings or movement disorder noted  Musculoskeletal - no joint tenderness, deformity or swelling  Extremities - peripheral pulses normal, no pedal edema, no clubbing or cyanosis       Diabetic foot exam performed by Gayatri Ruggiero MD     Measurement  Response Nurse Comment Physician Comment   Monofilament  R - 6/6  L - 6/6     Pulse DP R - present  L - present     Pulse TP R - present  L - present     Structural deformity R - None  L - None     Skin Integrity / Deformity R - None  L - None        Reviewed by:                   Assessment/Plan   Education and counseling provided:  Are appropriate based on today's review and evaluation  End-of-Life planning (with patient's consent)  Pneumococcal Vaccine  Diabetes screening test    1. Welcome to Medicare preventive visit  -     AMB POC EKG ROUTINE W/ 12 LEADS, SCREEN ()  2. Diabetes mellitus type 2, diet-controlled (HCC) diet controlled. Will check labs to be sure that is adequate. A1c goal of less than 7.  -     METABOLIC PANEL, COMPREHENSIVE; Future  -     CBC WITH AUTOMATED DIFF; Future  -     TSH 3RD GENERATION; Future  -     MICROALBUMIN, UR, RAND W/ MICROALB/CREAT RATIO; Future  -     URINALYSIS W/MICROSCOPIC; Future  -     HEMOGLOBIN A1C WITH EAG; Future  -     HM DIABETES FOOT EXAM  3. Lipids blood increased-LDL goal of 100. Tolerating statin drugs well. Will continue those.  -     LIPID PANEL; Future  4. Essential hypertension-blood pressure well controlled on ACE inhibitor. We will continue that and monitor. 5. Vitamin D deficiency  6. Encounter for immunization  -     PNEUMOCOCCAL POLYSACCHARIDE VACCINE, 23-VALENT, ADULT OR IMMUNOSUPPRESSED PT DOSE,  -     ADMIN PNEUMOCOCCAL VACCINE       Depression Risk Screen     3 most recent PHQ Screens 7/16/2021   Little interest or pleasure in doing things Not at all   Feeling down, depressed, irritable, or hopeless Not at all   Total Score PHQ 2 0       Alcohol Risk Screen    Do you average more than 1 drink per night or more than 7 drinks a week: No    In the past three months have you have had more than 4 drinks containing alcohol on one occasion: No          Functional Ability and Level of Safety    Diet: The patient is prescribed and follows a special diet. Hearing: Hearing is good. Vision Screening:  Vision is good. No exam data present      Activities of Daily Living: The home contains: no safety equipment. Patient does total self care      Ambulation: with no difficulty      Exercise level: extremely active     Fall Risk Screen:  Fall Risk Assessment, last 12 mths 7/16/2021   Able to walk? Yes   Fall in past 12 months? 0   Do you feel unsteady?  0   Are you worried about falling 0      Abuse Screen:  Patient is not abused       Screening EKG   EKG order placed: Yes    End of Life Planning   Advanced care planning directives were discussed with the patient and /or family/caregiver. Health Maintenance Due     Health Maintenance Due   Topic Date Due    Shingrix Vaccine Age 49> (2 of 2) 11/06/2019    Eye Exam Retinal or Dilated  04/17/2020    Pneumococcal 65+ years (1 of 1 - PPSV23) 03/30/2021    Foot Exam Q1  07/15/2021    MICROALBUMIN Q1  07/16/2021    Lipid Screen  07/15/2021       Patient Care Team   Patient Care Team:  Abimael Espinoza MD as PCP - General (Internal Medicine)  Abimael Espinoza MD as PCP - [facility] Empaneled Provider  Shai Torres MD (Urology)  Latonia Morales MD (Ophthalmology)  Meryle Pries, MD (Gastroenterology)    History     Past Medical History:   Diagnosis Date    Lipids blood increased 8/24/2009    Type II or unspecified type diabetes mellitus without mention of complication, not stated as uncontrolled 8/24/2009    Unspecified essential hypertension 8/24/2009      Past Surgical History:   Procedure Laterality Date    ENDOSCOPY, COLON, DIAGNOSTIC  1/2008    10 year follow-up    HX ABDOMINOPLASTY  8/5/2009    HX COLONOSCOPY  02/01/2018    Dr. Chow [facility] - 5 yr f/u    HX WISDOM TEETH EXTRACTION      24 yo    DE ENDOVEN ABLTJ INCMPTNT VEIN XTR LASER 2ND+ VEINS  2/24/2010    right leg    DE LAP, PLACE ADJUST GASTR BAND  5/1/2008     Current Outpatient Medications   Medication Sig Dispense Refill    hydroCHLOROthiazide (HYDRODIURIL) 25 mg tablet TAKE 1 TABLET DAILY 90 Tab 3    atorvastatin (LIPITOR) 10 mg tablet TAKE 1 TABLET DAILY 90 Tab 3    lisinopriL (PRINIVIL, ZESTRIL) 20 mg tablet TAKE 1 TABLET DAILY 90 Tab 3    saw palmetto 160 mg cap Take  by mouth.  tadalafil (CIALIS) 5 mg tablet Take 5 mg by mouth daily.       glucose blood VI test strips (ONE TOUCH ULTRA TEST) strip 2 Each by Does Not Apply route as needed. 200 Strip prn    PV W-O FRAN/FERROUS FUMARATE/FA (M-VIT PO) Take 1 Tab by mouth daily.        No Known Allergies    Family History   Problem Relation Age of Onset   Rosaura Neither Cancer Mother         leukemia and skin    Hypertension Mother     Heart Disease Father     Hypertension Father    Murrel Neither Elevated Lipids Father     Glaucoma Father     Diabetes Father     Hypertension Brother     High Cholesterol Brother     Diabetes Brother     Other Brother         [place] virus     Stroke Brother      Social History     Tobacco Use    Smoking status: Never Smoker    Smokeless tobacco: Never Used   Substance Use Topics    Alcohol use: No       Cortney Lennon MD

## 2025-07-11 NOTE — DISCHARGE SUMMARY
V2.0  Discharge Summary    Name:  Andrews Sanchez /Age/Sex: 1955 (69 y.o. male)   Admit Date: 2025  Discharge Date: 7/10/25    MRN & CSN:  [id number] & [id number] Encounter Date and Time 7/10/25 8:33 PM EDT    Attending:  No att. providers found Discharging Provider: Cyril Mccord MD       Hospital Course:     Brief HPI: Andrews Sanchez is a 69 y.o. male who presented with sepsis    Brief Problem Based Course:   Sepsis due to ascending cholangitis and E. coli bacteremia: Presented to the hospital with severe right upper quadrant abdominal pain, high-grade fevers found to have acutely risen liver enzymes.  Patient was started on antibiotics.  Initial imaging with US was negative, but subsequent CT did demonstrate inflammation involving the duodenum, gallbladder, and hepatic hilum.  MRCP for further demonstrated inflammation in the duodenum and gallbladder.  All 3 different imaging modalities (ultrasound, CT, and MRI) all failed to demonstrate gallstones or signs suggestive of previous gallstones.  Blood cultures returned positive for E. coli.  EGD was performed which was fairly unremarkable.  With antibiotics patient's liver labs proved significantly and patient no longer spiking high-grade fevers.  Case was discussed with general surgery who did not see an obvious indication for cholecystectomy; however, should patient have further issues in the future would recommend cholecystectomy with cholangiogram.  Patient was discharged on oral antibiotics to complete a 2-week course.  Patient will follow-up with his PCP for monitoring of liver labs      The patient expressed appropriate understanding of, and agreement with the discharge recommendations, medications, and plan.     Consults this admission:  PHARMACY TO DOSE VANCOMYCIN  IP CONSULT TO GENERAL SURGERY  IP CONSULT TO GI  IP CONSULT TO GI  IP CONSULT TO GENERAL SURGERY    Discharge Diagnosis:   Sepsis  Ascending cholangitis    Discharge Instruction:     CHOL 101 02/06/2023 08:43 AM    HDL 33 02/06/2023 08:43 AM    TRIG 100 02/06/2023 08:43 AM     Hemoglobin A1C:   Lab Results   Component Value Date/Time    LABA1C 5.6 07/09/2025 06:18 AM     TSH:   Lab Results   Component Value Date/Time    TSH 3.290 02/06/2023 08:43 AM     Troponin: No results found for: \"TROPONINT\"  Lactic Acid: No results for input(s): \"LACTA\" in the last 72 hours.  BNP: No results for input(s): \"PROBNP\" in the last 72 hours.  UA:  Lab Results   Component Value Date/Time    NITRU Negative 07/08/2025 10:22 AM    COLORU ORANGE 07/08/2025 10:22 AM    PHUR 6.0 07/08/2025 10:22 AM    WBCUA 3-5 07/08/2025 10:22 AM    RBCUA 0-2 07/08/2025 10:22 AM    MUCUS 1+ 07/08/2025 10:22 AM    BACTERIA 2+ 07/08/2025 10:22 AM    CLARITYU Clear 07/08/2025 10:22 AM    LEUKOCYTESUR Negative 07/08/2025 10:22 AM    UROBILINOGEN 4.0 07/08/2025 10:22 AM    BILIRUBINUR LARGE 07/08/2025 10:22 AM    BLOODU SMALL 07/08/2025 10:22 AM    GLUCOSEU 100 07/08/2025 10:22 AM    KETUA Negative 07/08/2025 10:22 AM    AMORPHOUS 2+ 07/08/2025 10:22 AM     Urine Cultures: No results found for: \"LABURIN\"  Blood Cultures:   Lab Results   Component Value Date/Time    BC  07/07/2025 09:19 PM     POSITIVE  No further workup  Isolated two of two sets  Refer to culture [id number] for sensitivity results       Lab Results   Component Value Date/Time    BLOODCULT2 See additional report for complete BCID panel. 07/07/2025 09:19 PM    BLOODCULT2 POSITIVE  Isolated two of two sets   07/07/2025 09:19 PM     Organism:   Lab Results   Component Value Date/Time    ORG Escherichia coli 07/07/2025 09:19 PM    ORG Escherichia coli DNA Detected 07/07/2025 09:19 PM    ORG Escherichia coli 07/07/2025 09:19 PM       Time Spent Discharging patient 45 minutes    Electronically signed by Cyril Mccord MD on 7/10/2025 at 8:33 PM

## 2025-07-22 ENCOUNTER — OFFICE VISIT (OUTPATIENT)
Dept: SURGERY | Age: 70
End: 2025-07-22
Payer: MEDICARE

## 2025-07-22 DIAGNOSIS — K83.09: Primary | ICD-10-CM

## 2025-07-22 PROCEDURE — 99215 OFFICE O/P EST HI 40 MIN: CPT | Performed by: STUDENT IN AN ORGANIZED HEALTH CARE EDUCATION/TRAINING PROGRAM

## 2025-07-22 PROCEDURE — G8419 CALC BMI OUT NRM PARAM NOF/U: HCPCS | Performed by: STUDENT IN AN ORGANIZED HEALTH CARE EDUCATION/TRAINING PROGRAM

## 2025-07-22 PROCEDURE — 3017F COLORECTAL CA SCREEN DOC REV: CPT | Performed by: STUDENT IN AN ORGANIZED HEALTH CARE EDUCATION/TRAINING PROGRAM

## 2025-07-22 PROCEDURE — 1123F ACP DISCUSS/DSCN MKR DOCD: CPT | Performed by: STUDENT IN AN ORGANIZED HEALTH CARE EDUCATION/TRAINING PROGRAM

## 2025-07-22 PROCEDURE — 1159F MED LIST DOCD IN RCRD: CPT | Performed by: STUDENT IN AN ORGANIZED HEALTH CARE EDUCATION/TRAINING PROGRAM

## 2025-07-22 PROCEDURE — 1111F DSCHRG MED/CURRENT MED MERGE: CPT | Performed by: STUDENT IN AN ORGANIZED HEALTH CARE EDUCATION/TRAINING PROGRAM

## 2025-07-22 PROCEDURE — 1036F TOBACCO NON-USER: CPT | Performed by: STUDENT IN AN ORGANIZED HEALTH CARE EDUCATION/TRAINING PROGRAM

## 2025-07-22 PROCEDURE — G8427 DOCREV CUR MEDS BY ELIG CLIN: HCPCS | Performed by: STUDENT IN AN ORGANIZED HEALTH CARE EDUCATION/TRAINING PROGRAM

## 2025-07-22 NOTE — PROGRESS NOTES
GENERAL SURGERY  Office Note    Patient Name: Andrews Sanchez  MRN: [id number]  YOB: 1955   Date of Service: 7/22/2025    Hospital Follow-Up    Chief Complaint   Patient presents with    Follow-up     NEW PATIENT - F/U from hospital - no pain, constipation, appetite coming back, feeling more tired        SUBJECTIVE:     History of Present Illness  The patient is a 69-year-old male presenting with abdominal pain, accompanied by his wife.    Abdominal Pain  - Experienced abdominal pain following the ingestion of a sandwich on 07/2025.  - A CT scan at [facility] was unremarkable.  - Laboratory tests on 07/07/2025 revealed elevated bilirubin levels; the hepatitis panel was negative.  - Bilirubin levels subsequently increased to 3.4 at [facility].  - An ultrasound examination revealed no cholelithiasis but was inconclusive.  - A repeat CT scan on 07/08/2025 demonstrated inflammation surrounding the gallbladder and duodenum.  - MRI also indicated inflammation but was inconclusive.  - Bilirubin levels eventually normalized.  - An EGD performed on 07/10/2025 was normal.    E. coli bacteremia  - Has a history of sepsis secondary to a bacterial infection of unknown etiology  - Has abstained from alcohol since his hospital discharge, though he is contemplating resuming moderate consumption.  - Was advised to return to [facility] if symptoms recur.    COVID-19  - Admitted to [facility] in 07/2020 for 15 days due to COVID-19.    Prediabetes Management  - Has been receiving Trulicity injections for 2-3 years for the management of prediabetes.  - Trulicity injections have been marginally beneficial for weight loss.    Spondylosis  - Has undergone MRI and epidural injections at the T8 and T7 vertebrae, as well as the lower back.    Supplemental Information  - He has been under the care of Dr. Garcia (GI) and underwent a colonoscopy in 2021.      SOCIAL HISTORY  . Walks

## (undated) DEVICE — FORMALIN CLEAR VIAL 20 ML 10%

## (undated) DEVICE — ENDOSCOPY KIT: Brand: MEDLINE INDUSTRIES, INC.

## (undated) DEVICE — FORCEPS BX 240CM 2.4MM L NDL RAD JAW 4 M00513334

## (undated) DEVICE — BITE BLOCK ENDOSCP AD 60 FR W/ ADJ STRP PLAS GRN BLOX